# Patient Record
Sex: FEMALE | Race: WHITE | NOT HISPANIC OR LATINO
[De-identification: names, ages, dates, MRNs, and addresses within clinical notes are randomized per-mention and may not be internally consistent; named-entity substitution may affect disease eponyms.]

---

## 2019-04-22 ENCOUNTER — APPOINTMENT (OUTPATIENT)
Dept: ORTHOPEDIC SURGERY | Facility: CLINIC | Age: 79
End: 2019-04-22
Payer: MEDICARE

## 2019-04-22 VITALS — BODY MASS INDEX: 31.65 KG/M2 | HEIGHT: 65 IN | WEIGHT: 190 LBS | RESPIRATION RATE: 16 BRPM

## 2019-04-22 DIAGNOSIS — Z82.61 FAMILY HISTORY OF ARTHRITIS: ICD-10-CM

## 2019-04-22 DIAGNOSIS — M19.041 PRIMARY OSTEOARTHRITIS, RIGHT HAND: ICD-10-CM

## 2019-04-22 DIAGNOSIS — Z78.9 OTHER SPECIFIED HEALTH STATUS: ICD-10-CM

## 2019-04-22 DIAGNOSIS — M19.042 PRIMARY OSTEOARTHRITIS, RIGHT HAND: ICD-10-CM

## 2019-04-22 DIAGNOSIS — Z00.00 ENCOUNTER FOR GENERAL ADULT MEDICAL EXAMINATION W/OUT ABNORMAL FINDINGS: ICD-10-CM

## 2019-04-22 PROCEDURE — 76882 US LMTD JT/FCL EVL NVASC XTR: CPT | Mod: 59,LT

## 2019-04-22 PROCEDURE — 20600 DRAIN/INJ JOINT/BURSA W/O US: CPT | Mod: LT

## 2019-04-22 PROCEDURE — 73130 X-RAY EXAM OF HAND: CPT | Mod: 50

## 2019-04-22 PROCEDURE — 99203 OFFICE O/P NEW LOW 30 MIN: CPT | Mod: 25

## 2019-07-01 ENCOUNTER — APPOINTMENT (OUTPATIENT)
Dept: ORTHOPEDIC SURGERY | Facility: CLINIC | Age: 79
End: 2019-07-01
Payer: MEDICARE

## 2019-07-01 VITALS — BODY MASS INDEX: 31.65 KG/M2 | WEIGHT: 190 LBS | HEIGHT: 65 IN | RESPIRATION RATE: 16 BRPM

## 2019-07-01 DIAGNOSIS — R22.32 LOCALIZED SWELLING, MASS AND LUMP, LEFT UPPER LIMB: ICD-10-CM

## 2019-07-01 PROCEDURE — 99214 OFFICE O/P EST MOD 30 MIN: CPT | Mod: 25

## 2019-07-01 PROCEDURE — 20612 ASPIRATE/INJ GANGLION CYST: CPT | Mod: LT

## 2019-11-14 ENCOUNTER — RESULT REVIEW (OUTPATIENT)
Age: 79
End: 2019-11-14

## 2019-11-14 ENCOUNTER — INPATIENT (INPATIENT)
Facility: HOSPITAL | Age: 79
LOS: 3 days | Discharge: HOME | DRG: 331 | End: 2019-11-18
Attending: SURGERY | Admitting: SURGERY
Payer: MEDICARE

## 2019-11-14 VITALS
WEIGHT: 190.04 LBS | DIASTOLIC BLOOD PRESSURE: 78 MMHG | RESPIRATION RATE: 18 BRPM | HEART RATE: 60 BPM | TEMPERATURE: 98 F | SYSTOLIC BLOOD PRESSURE: 130 MMHG | HEIGHT: 65 IN

## 2019-11-14 DIAGNOSIS — Z90.710 ACQUIRED ABSENCE OF BOTH CERVIX AND UTERUS: Chronic | ICD-10-CM

## 2019-11-14 LAB
ALBUMIN SERPL ELPH-MCNC: 3.5 G/DL — SIGNIFICANT CHANGE UP (ref 3.4–5)
ALP SERPL-CCNC: 50 U/L — SIGNIFICANT CHANGE UP (ref 40–120)
ALT FLD-CCNC: 22 U/L — SIGNIFICANT CHANGE UP (ref 12–42)
ANION GAP SERPL CALC-SCNC: 7 MMOL/L — LOW (ref 9–16)
APPEARANCE UR: CLEAR — SIGNIFICANT CHANGE UP
APTT BLD: 27 SEC — LOW (ref 27.5–36.3)
AST SERPL-CCNC: 17 U/L — SIGNIFICANT CHANGE UP (ref 15–37)
BASOPHILS # BLD AUTO: 0.03 K/UL — SIGNIFICANT CHANGE UP (ref 0–0.2)
BASOPHILS NFR BLD AUTO: 0.3 % — SIGNIFICANT CHANGE UP (ref 0–2)
BILIRUB SERPL-MCNC: 0.8 MG/DL — SIGNIFICANT CHANGE UP (ref 0.2–1.2)
BILIRUB UR-MCNC: NEGATIVE — SIGNIFICANT CHANGE UP
BLD GP AB SCN SERPL QL: NEGATIVE — SIGNIFICANT CHANGE UP
BLD GP AB SCN SERPL QL: NEGATIVE — SIGNIFICANT CHANGE UP
BUN SERPL-MCNC: 14 MG/DL — SIGNIFICANT CHANGE UP (ref 7–23)
CALCIUM SERPL-MCNC: 9.5 MG/DL — SIGNIFICANT CHANGE UP (ref 8.5–10.5)
CHLORIDE SERPL-SCNC: 104 MMOL/L — SIGNIFICANT CHANGE UP (ref 96–108)
CO2 SERPL-SCNC: 29 MMOL/L — SIGNIFICANT CHANGE UP (ref 22–31)
COLOR SPEC: YELLOW — SIGNIFICANT CHANGE UP
CREAT SERPL-MCNC: 0.71 MG/DL — SIGNIFICANT CHANGE UP (ref 0.5–1.3)
DIFF PNL FLD: NEGATIVE — SIGNIFICANT CHANGE UP
EOSINOPHIL # BLD AUTO: 0.04 K/UL — SIGNIFICANT CHANGE UP (ref 0–0.5)
EOSINOPHIL NFR BLD AUTO: 0.3 % — SIGNIFICANT CHANGE UP (ref 0–6)
GLUCOSE SERPL-MCNC: 97 MG/DL — SIGNIFICANT CHANGE UP (ref 70–99)
GLUCOSE UR QL: NEGATIVE — SIGNIFICANT CHANGE UP
HCT VFR BLD CALC: 44.9 % — SIGNIFICANT CHANGE UP (ref 34.5–45)
HGB BLD-MCNC: 14.7 G/DL — SIGNIFICANT CHANGE UP (ref 11.5–15.5)
IMM GRANULOCYTES NFR BLD AUTO: 0.3 % — SIGNIFICANT CHANGE UP (ref 0–1.5)
INR BLD: 1.07 — SIGNIFICANT CHANGE UP (ref 0.88–1.16)
KETONES UR-MCNC: NEGATIVE — SIGNIFICANT CHANGE UP
LACTATE SERPL-SCNC: 2.2 MMOL/L — HIGH (ref 0.4–2)
LEUKOCYTE ESTERASE UR-ACNC: NEGATIVE — SIGNIFICANT CHANGE UP
LIDOCAIN IGE QN: 107 U/L — SIGNIFICANT CHANGE UP (ref 73–393)
LYMPHOCYTES # BLD AUTO: 2.36 K/UL — SIGNIFICANT CHANGE UP (ref 1–3.3)
LYMPHOCYTES # BLD AUTO: 20.6 % — SIGNIFICANT CHANGE UP (ref 13–44)
MAGNESIUM SERPL-MCNC: 2.3 MG/DL — SIGNIFICANT CHANGE UP (ref 1.6–2.6)
MCHC RBC-ENTMCNC: 29.2 PG — SIGNIFICANT CHANGE UP (ref 27–34)
MCHC RBC-ENTMCNC: 32.7 GM/DL — SIGNIFICANT CHANGE UP (ref 32–36)
MCV RBC AUTO: 89.1 FL — SIGNIFICANT CHANGE UP (ref 80–100)
MONOCYTES # BLD AUTO: 0.81 K/UL — SIGNIFICANT CHANGE UP (ref 0–0.9)
MONOCYTES NFR BLD AUTO: 7.1 % — SIGNIFICANT CHANGE UP (ref 2–14)
NEUTROPHILS # BLD AUTO: 8.19 K/UL — HIGH (ref 1.8–7.4)
NEUTROPHILS NFR BLD AUTO: 71.4 % — SIGNIFICANT CHANGE UP (ref 43–77)
NITRITE UR-MCNC: NEGATIVE — SIGNIFICANT CHANGE UP
NRBC # BLD: 0 /100 WBCS — SIGNIFICANT CHANGE UP (ref 0–0)
PH UR: 6 — SIGNIFICANT CHANGE UP (ref 5–8)
PLATELET # BLD AUTO: 188 K/UL — SIGNIFICANT CHANGE UP (ref 150–400)
POTASSIUM SERPL-MCNC: 4.3 MMOL/L — SIGNIFICANT CHANGE UP (ref 3.5–5.3)
POTASSIUM SERPL-SCNC: 4.3 MMOL/L — SIGNIFICANT CHANGE UP (ref 3.5–5.3)
PROT SERPL-MCNC: 7.5 G/DL — SIGNIFICANT CHANGE UP (ref 6.4–8.2)
PROT UR-MCNC: NEGATIVE MG/DL — SIGNIFICANT CHANGE UP
PROTHROM AB SERPL-ACNC: 12.1 SEC — SIGNIFICANT CHANGE UP (ref 10–12.9)
RBC # BLD: 5.04 M/UL — SIGNIFICANT CHANGE UP (ref 3.8–5.2)
RBC # FLD: 13.2 % — SIGNIFICANT CHANGE UP (ref 10.3–14.5)
RH IG SCN BLD-IMP: POSITIVE — SIGNIFICANT CHANGE UP
RH IG SCN BLD-IMP: POSITIVE — SIGNIFICANT CHANGE UP
SODIUM SERPL-SCNC: 140 MMOL/L — SIGNIFICANT CHANGE UP (ref 132–145)
SP GR SPEC: <=1.005 — SIGNIFICANT CHANGE UP (ref 1–1.03)
TROPONIN I SERPL-MCNC: <0.017 NG/ML — LOW (ref 0.02–0.06)
UROBILINOGEN FLD QL: 0.2 E.U./DL — SIGNIFICANT CHANGE UP
WBC # BLD: 11.46 K/UL — HIGH (ref 3.8–10.5)
WBC # FLD AUTO: 11.46 K/UL — HIGH (ref 3.8–10.5)

## 2019-11-14 PROCEDURE — 74177 CT ABD & PELVIS W/CONTRAST: CPT | Mod: 26

## 2019-11-14 PROCEDURE — 71045 X-RAY EXAM CHEST 1 VIEW: CPT | Mod: 26

## 2019-11-14 PROCEDURE — 99285 EMERGENCY DEPT VISIT HI MDM: CPT

## 2019-11-14 RX ORDER — HYDROMORPHONE HYDROCHLORIDE 2 MG/ML
1 INJECTION INTRAMUSCULAR; INTRAVENOUS; SUBCUTANEOUS EVERY 4 HOURS
Refills: 0 | Status: DISCONTINUED | OUTPATIENT
Start: 2019-11-14 | End: 2019-11-18

## 2019-11-14 RX ORDER — PIPERACILLIN AND TAZOBACTAM 4; .5 G/20ML; G/20ML
3.38 INJECTION, POWDER, LYOPHILIZED, FOR SOLUTION INTRAVENOUS EVERY 6 HOURS
Refills: 0 | Status: DISCONTINUED | OUTPATIENT
Start: 2019-11-14 | End: 2019-11-18

## 2019-11-14 RX ORDER — SODIUM CHLORIDE 9 MG/ML
1000 INJECTION, SOLUTION INTRAVENOUS
Refills: 0 | Status: DISCONTINUED | OUTPATIENT
Start: 2019-11-14 | End: 2019-11-18

## 2019-11-14 RX ORDER — METRONIDAZOLE 500 MG
500 TABLET ORAL ONCE
Refills: 0 | Status: COMPLETED | OUTPATIENT
Start: 2019-11-14 | End: 2019-11-14

## 2019-11-14 RX ORDER — CEFTRIAXONE 500 MG/1
1000 INJECTION, POWDER, FOR SOLUTION INTRAMUSCULAR; INTRAVENOUS ONCE
Refills: 0 | Status: COMPLETED | OUTPATIENT
Start: 2019-11-14 | End: 2019-11-14

## 2019-11-14 RX ORDER — PIPERACILLIN AND TAZOBACTAM 4; .5 G/20ML; G/20ML
3.38 INJECTION, POWDER, LYOPHILIZED, FOR SOLUTION INTRAVENOUS ONCE
Refills: 0 | Status: COMPLETED | OUTPATIENT
Start: 2019-11-14 | End: 2019-11-14

## 2019-11-14 RX ORDER — SODIUM CHLORIDE 9 MG/ML
1000 INJECTION, SOLUTION INTRAVENOUS
Refills: 0 | Status: DISCONTINUED | OUTPATIENT
Start: 2019-11-14 | End: 2019-11-14

## 2019-11-14 RX ORDER — ONDANSETRON 8 MG/1
4 TABLET, FILM COATED ORAL EVERY 6 HOURS
Refills: 0 | Status: DISCONTINUED | OUTPATIENT
Start: 2019-11-14 | End: 2019-11-14

## 2019-11-14 RX ORDER — HYDROMORPHONE HYDROCHLORIDE 2 MG/ML
0.5 INJECTION INTRAMUSCULAR; INTRAVENOUS; SUBCUTANEOUS EVERY 4 HOURS
Refills: 0 | Status: DISCONTINUED | OUTPATIENT
Start: 2019-11-14 | End: 2019-11-14

## 2019-11-14 RX ORDER — HEPARIN SODIUM 5000 [USP'U]/ML
5000 INJECTION INTRAVENOUS; SUBCUTANEOUS EVERY 8 HOURS
Refills: 0 | Status: DISCONTINUED | OUTPATIENT
Start: 2019-11-14 | End: 2019-11-14

## 2019-11-14 RX ORDER — HYDROMORPHONE HYDROCHLORIDE 2 MG/ML
0.5 INJECTION INTRAMUSCULAR; INTRAVENOUS; SUBCUTANEOUS EVERY 4 HOURS
Refills: 0 | Status: DISCONTINUED | OUTPATIENT
Start: 2019-11-14 | End: 2019-11-18

## 2019-11-14 RX ORDER — HYDROMORPHONE HYDROCHLORIDE 2 MG/ML
1 INJECTION INTRAMUSCULAR; INTRAVENOUS; SUBCUTANEOUS EVERY 4 HOURS
Refills: 0 | Status: DISCONTINUED | OUTPATIENT
Start: 2019-11-14 | End: 2019-11-14

## 2019-11-14 RX ORDER — IOHEXOL 300 MG/ML
30 INJECTION, SOLUTION INTRAVENOUS ONCE
Refills: 0 | Status: COMPLETED | OUTPATIENT
Start: 2019-11-14 | End: 2019-11-14

## 2019-11-14 RX ADMIN — Medication 100 MILLIGRAM(S): at 13:36

## 2019-11-14 RX ADMIN — IOHEXOL 30 MILLILITER(S): 300 INJECTION, SOLUTION INTRAVENOUS at 12:35

## 2019-11-14 RX ADMIN — PIPERACILLIN AND TAZOBACTAM 200 GRAM(S): 4; .5 INJECTION, POWDER, LYOPHILIZED, FOR SOLUTION INTRAVENOUS at 22:42

## 2019-11-14 RX ADMIN — CEFTRIAXONE 100 MILLIGRAM(S): 500 INJECTION, POWDER, FOR SOLUTION INTRAMUSCULAR; INTRAVENOUS at 13:01

## 2019-11-14 NOTE — ED ADULT TRIAGE NOTE - CHIEF COMPLAINT QUOTE
pt c/o rlq pain since last night. denies n/v/d. PMD stated to go to ED to r/o appy and diverticulitis. hx hysterectomy.

## 2019-11-14 NOTE — ED ADULT NURSE NOTE - OBJECTIVE STATEMENT
A&Ox3, c/o abdominal pain to RLQ that started yesterday evening - denies N/V/D. In no acute distress - family at the bedside.

## 2019-11-14 NOTE — ED PROVIDER NOTE - CLINICAL SUMMARY MEDICAL DECISION MAKING FREE TEXT BOX
Patient presents with 1 day hx of RLQ pain concerning for appendicitis/diverticulitis. Will check labs, CT abd/pelvis, and give empiric Ceftriaxone and Flagyl. Patient declined pain meds at this time.

## 2019-11-14 NOTE — ED ADULT NURSE NOTE - ED STAT RN HAND OFF
CHOLECYSTECTOMY  2012    [place]    COLONOSCOPY  1-    Dr LUJAN    COLONOSCOPY  05/06/2014    Dr. Mari Hdz  2012    Dr Ricky Kat    partial    KNEE SURGERY  2011    left knee surgery    PTCA      stent    TUBAL LIGATION      UPPER GASTROINTESTINAL ENDOSCOPY  2009?  UPPER GASTROINTESTINAL ENDOSCOPY  1-    Dr LUJAN    UPPER GASTROINTESTINAL ENDOSCOPY  05/06/2014    Dr. Andrade Shadow   01- [facility]    Left Leg Ablation    VARICOSE VEIN SURGERY  3-  [facility]    right leg ablation       Current Outpatient Prescriptions   Medication Sig Dispense Refill    Mirabegron ER 50 MG TB24 Take 50 mg by mouth daily 30 tablet 0    metoprolol (TOPROL-XL) 200 MG XL tablet Take 200 mg by mouth daily      ranitidine (ZANTAC 150 MAXIMUM STRENGTH) 150 MG tablet Take 150 mg by mouth nightly      BENICAR HCT 40-25 MG per tablet Take 1 tablet by mouth daily       calcium carbonate 600 MG TABS tablet Take 1 tablet by mouth 2 times daily as needed       Vitamin D (CHOLECALCIFEROL) 1000 UNITS CAPS capsule Take 3,000 Units by mouth daily       clopidogrel (PLAVIX) 75 MG tablet Take 75 mg by mouth daily.  allopurinol (ZYLOPRIM) 300 MG tablet Take 300 mg by mouth daily.  aspirin 81 MG EC tablet Take 81 mg by mouth daily.  loratadine (CLARITIN) 10 MG tablet Take 10 mg by mouth daily.  Multiple Vitamin (MULTIVITAMIN PO) Take 1 tablet by mouth daily        No current facility-administered medications for this visit. Allergies   Allergen Reactions    Reclast [Zoledronic Acid] Other (See Comments)     Patient has a intolerance to this medication .  It makes her feel \"out of it\"     Celexa [Citalopram Hydrobromide] Nausea Only     Nausea and headache    Demerol Nausea Only    Dye [Barium-Containing Compounds] Nausea Only    Hydrocodone-Acetaminophen Other (See Comments)     Shaking, attacks nervous system    Lasix [Furosemide] Nausea Only    Neurontin [Gabapentin] Nausea Only     Dizziness      Nitroglycerin Other (See Comments)     Passed out    Plaquenil [Hydroxychloroquine Sulfate] Other (See Comments)     nervousness    Cozaar [Losartan Potassium] Palpitations    Esomeprazole Magnesium Nausea And Vomiting    Lisinopril Other (See Comments)     Muscle cramps in legs    Prilosec [Omeprazole] Nausea And Vomiting       Social History     Social History    Marital status:      Spouse name: N/A    Number of children: N/A    Years of education: N/A     Social History Main Topics    Smoking status: Never Smoker    Smokeless tobacco: Never Used    Alcohol use No    Drug use: No    Sexual activity: Not Asked     Other Topics Concern    None     Social History Narrative    None       Family History   Problem Relation Age of Onset    Diabetes Mother     Heart Disease Mother     Heart Disease Father     Diabetes Father     High Blood Pressure Father     Esophageal Cancer Father     Colon Polyps Father     Crohn's Disease Brother     Colon Cancer Neg Hx     Liver Cancer Neg Hx     Liver Disease Neg Hx     Rectal Cancer Neg Hx     Stomach Cancer Neg Hx        REVIEW OF SYSTEMS:  Review of Systems   Constitutional: Negative for chills and fever. HENT: Negative for hearing loss. Eyes: Negative for discharge and redness. Respiratory: Negative for shortness of breath and wheezing. Cardiovascular: Negative for leg swelling and PND. Gastrointestinal: Negative for heartburn and nausea. Genitourinary: Positive for frequency and urgency. Negative for dysuria, flank pain and hematuria. Musculoskeletal: Negative for myalgias and neck pain. Skin: Negative for itching and rash. Neurological: Negative for seizures, loss of consciousness and headaches.    Endo/Heme/Allergies: Negative for environmental Handoff

## 2019-11-14 NOTE — PATIENT PROFILE ADULT - NSPROMEDSPUMP_GEN_A_NUR
Patient arrives [facility] for abdominal pain shortness of breath nausea vomiting diarrhea. Patient to have follow up appointment with GI on Monday. Denies fevers at home. Patient states upper abdominal pain epigastric pain states worse last night.  Recent high blood wbc count and on antibx but finished recently
none

## 2019-11-14 NOTE — H&P ADULT - NSHPLABSRESULTS_GEN_ALL_CORE
LABS:                        14.7   11.46 )-----------( 188      ( 14 Nov 2019 12:48 )             44.9     11-14    140  |  104  |  14  ----------------------------<  97  4.3   |  29  |  0.71    Ca    9.5      14 Nov 2019 12:48  Mg     2.3     11-14    TPro  7.5  /  Alb  3.5  /  TBili  0.8  /  DBili  x   /  AST  17  /  ALT  22  /  AlkPhos  50  11-14      Urinalysis Basic - ( 14 Nov 2019 14:10 )    Color: Yellow / Appearance: Clear / SG: <=1.005 / pH: x  Gluc: x / Ketone: NEGATIVE  / Bili: NEGATIVE / Urobili: 0.2 E.U./dL   Blood: x / Protein: NEGATIVE mg/dL / Nitrite: NEGATIVE   Leuk Esterase: NEGATIVE / RBC: x / WBC x   Sq Epi: x / Non Sq Epi: x / Bacteria: x        RADIOLOGY & ADDITIONAL STUDIES:  CT Abdomen and Pelvis w/ Oral Cont and w/ IV Cont:   EXAM:  CT ABDOMEN AND PELVIS OC IC                           PROCEDURE DATE:  11/14/2019          INTERPRETATION:  CT of the ABDOMEN and PELVIS with intravenous contrast   dated 11/14/2019 2:35 PM    INDICATION: RLQ pain     TECHNIQUE: CT of the abdomen and pelvis was performed. Axial and coronal    and sagittal images were produced and reviewed.    CONTRAST USAGE:  IV contrast: Omnipaque 350: 100 ml administered; ml discarded.    Oral contrast: administered.    PRIOR STUDIES: None.    FINDINGS: Images of the lower chest demonstrate no abnormality.    The liver is normal in size. Three cysts versus biliary hamartomas in the   liver including two in the caudate lobe/segment one and another at   junction of segments five and six. Status post cholecystectomy..  The   pancreas is normal in appearance.  No splenic abnormalities are seen.    The adrenal glands are unremarkable. The kidneys are normal in   appearance.        No abdominal aortic aneurysm is seen. No lymphadenopathy is seen.     Evaluation of the bowel demonstrates that the ingested oral contrast   material has reached the distal small bowel. No bowel obstruction. There   is evidence of abnormal retrocecal appendix with a diameter of 1.7 cm.   Appendiceal wall thickening. Periappendiceal fat infiltration. No piyush   appendiceal abscess. No macro perforation.. No ascites is seen. Small   midline fat-containing inferior periumbilical hernia.  2 infraumbilical midline fat-containing hernias. Fat-containing left   indirect inguinal hernia. The colon is loaded with fecal material. No   cecal mass identified.    Images of the pelvis demonstrate that the patient is status post   hysterectomy. Vaginal cuff intact. The ovaries are not identified in the   site. Likely status post bilateral oophorectomy. Unremarkable urinary   bladder.     Evaluation of the osseous structures demonstrates no significant finding.      IMPRESSION: Acute appendicitis.    Findings called in to ER at 2:50 PM 11/14/2019        AMMON ESCOTO M.D., ATTENDING RADIOLOGIST  This document has been electronically signed.  Nov 14 2019  2:54PM              (11-14-19 @ 14:35)

## 2019-11-14 NOTE — PROGRESS NOTE ADULT - SUBJECTIVE AND OBJECTIVE BOX
POST-OPERATIVE NOTE    Procedure: Lap appy    Diagnosis/Indication: appendicitis    Surgeon: Guerrero    S: C/o of expected post-operative pain, fairly well controlled w/ medication. Denies CP, SOB, GUTIERREZ, calf tenderness. Denies nausea, vomiting.    O:  T(C): 36.8 (11-14-19 @ 21:45), Max: 36.8 (11-14-19 @ 20:45)  T(F): 98.3 (11-14-19 @ 21:45), Max: 98.3 (11-14-19 @ 21:45)  HR: 64 (11-14-19 @ 23:00) (54 - 64)  BP: 125/64 (11-14-19 @ 23:00) (110/58 - 132/68)  RR: 16 (11-14-19 @ 23:00) (14 - 18)  SpO2: 95% (11-14-19 @ 23:00) (94% - 95%)  Wt(kg): --                        14.7   11.46 )-----------( 188      ( 14 Nov 2019 12:48 )             44.9     11-14    140  |  104  |  14  ----------------------------<  97  4.3   |  29  |  0.71    Ca    9.5      14 Nov 2019 12:48  Mg     2.3     11-14    TPro  7.5  /  Alb  3.5  /  TBili  0.8  /  DBili  x   /  AST  17  /  ALT  22  /  AlkPhos  50  11-14      Gen: NAD, resting comfortably in bed  C/V: NSR  Pulm: Nonlabored breathing, no respiratory distress  Abd: soft, appropriately tender, non distended, incisions clean, dry and intact.   Extrem: WWP, no calf edema or tenderness, SCDs in place

## 2019-11-14 NOTE — ED PROVIDER NOTE - ENMT, MLM
Airway patent. Moist mucous membrane. Throat has no vesicles, no oropharyngeal exudates and uvula is midline.

## 2019-11-14 NOTE — PROGRESS NOTE ADULT - ASSESSMENT
A/P: 78y Female s/p above procedure  NPO/IVF  Pain/nausea control  SQH/SCDs/OOBA/IS  Dispo pending pain control, PO tolerance, clinical improvement

## 2019-11-14 NOTE — ED PROVIDER NOTE - OBJECTIVE STATEMENT
57 y/o female with PMHx of hysterectomy for fibroids 25 years ago presents to ED c/o abd pain. Patient reports she ate a pie at approximately 11:30AM yesterday, and endorses slightly worsening RLQ abd pain since then. States she developed shaking chills at around 4PM yesterday, and notes some decreased appetite (skipped dinner and had a light breakfast in the morning). Patient states symptoms slightly improved throughout the night and reports that pain is now mostly with palpation. Denies any fever, nausea, vomiting, diarrhea, constipation, dysuria, or hematuria. States she called her PCP today, who advised her to come to the ED for evaluation to r/o appendicitis and diverticulitis. 59 y/o female with PMHx of hysterectomy for fibroids 25 years ago presents to ED c/o abd pain. Patient reports she ate a pie at approximately 11:30AM yesterday, and endorses slightly worsening RLQ abd pain since then. States she developed shaking chills at around 4PM yesterday, and notes some decreased appetite (skipped dinner and had a light breakfast in the morning). Patient states symptoms slightly improved throughout the night and reports that pain is now mostly with palpation. Denies any fever, nausea, vomiting, diarrhea, constipation, dysuria, or hematuria. States she called her PCP today, who advised her to come to the ED for evaluation to r/o appendicitis and diverticulitis.    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 Has a tour this Sat. She is accompanied by her wife of 25 years.

## 2019-11-14 NOTE — H&P ADULT - HISTORY OF PRESENT ILLNESS
78F w/ PMHx BHUPENDRA-BSO for uterine fibroids 26 years ago and no other PMHx presents w/ 1 day history of abd pain which started in RLQ and spread throughout lower abdomen with associated chills and anorexia. Patient states in the morning after eating pie she noted this abdominal pain which persisted until this morning at which time it was only painful when she pressed on it. She denies CP, SOB, N/V, constipation, diarrhea. Last BM was this morning and was normal. Last colonoscopy was within the last 3-5 years and was wnl.

## 2019-11-15 LAB
ANION GAP SERPL CALC-SCNC: 10 MMOL/L — SIGNIFICANT CHANGE UP (ref 5–17)
BUN SERPL-MCNC: 12 MG/DL — SIGNIFICANT CHANGE UP (ref 7–23)
CALCIUM SERPL-MCNC: 9.2 MG/DL — SIGNIFICANT CHANGE UP (ref 8.4–10.5)
CHLORIDE SERPL-SCNC: 106 MMOL/L — SIGNIFICANT CHANGE UP (ref 96–108)
CO2 SERPL-SCNC: 25 MMOL/L — SIGNIFICANT CHANGE UP (ref 22–31)
CREAT SERPL-MCNC: 0.65 MG/DL — SIGNIFICANT CHANGE UP (ref 0.5–1.3)
GLUCOSE SERPL-MCNC: 168 MG/DL — HIGH (ref 70–99)
GRAM STN FLD: SIGNIFICANT CHANGE UP
HCT VFR BLD CALC: 41.1 % — SIGNIFICANT CHANGE UP (ref 34.5–45)
HGB BLD-MCNC: 13.5 G/DL — SIGNIFICANT CHANGE UP (ref 11.5–15.5)
MAGNESIUM SERPL-MCNC: 2.2 MG/DL — SIGNIFICANT CHANGE UP (ref 1.6–2.6)
MCHC RBC-ENTMCNC: 29.3 PG — SIGNIFICANT CHANGE UP (ref 27–34)
MCHC RBC-ENTMCNC: 32.8 GM/DL — SIGNIFICANT CHANGE UP (ref 32–36)
MCV RBC AUTO: 89.3 FL — SIGNIFICANT CHANGE UP (ref 80–100)
NRBC # BLD: 0 /100 WBCS — SIGNIFICANT CHANGE UP (ref 0–0)
PHOSPHATE SERPL-MCNC: 3.4 MG/DL — SIGNIFICANT CHANGE UP (ref 2.5–4.5)
PLATELET # BLD AUTO: 170 K/UL — SIGNIFICANT CHANGE UP (ref 150–400)
POTASSIUM SERPL-MCNC: 4.3 MMOL/L — SIGNIFICANT CHANGE UP (ref 3.5–5.3)
POTASSIUM SERPL-SCNC: 4.3 MMOL/L — SIGNIFICANT CHANGE UP (ref 3.5–5.3)
RBC # BLD: 4.6 M/UL — SIGNIFICANT CHANGE UP (ref 3.8–5.2)
RBC # FLD: 12.9 % — SIGNIFICANT CHANGE UP (ref 10.3–14.5)
SODIUM SERPL-SCNC: 141 MMOL/L — SIGNIFICANT CHANGE UP (ref 135–145)
SPECIMEN SOURCE: SIGNIFICANT CHANGE UP
WBC # BLD: 11.37 K/UL — HIGH (ref 3.8–10.5)
WBC # FLD AUTO: 11.37 K/UL — HIGH (ref 3.8–10.5)

## 2019-11-15 RX ORDER — KETOROLAC TROMETHAMINE 30 MG/ML
15 SYRINGE (ML) INJECTION EVERY 6 HOURS
Refills: 0 | Status: DISCONTINUED | OUTPATIENT
Start: 2019-11-15 | End: 2019-11-18

## 2019-11-15 RX ORDER — ACETAMINOPHEN 500 MG
650 TABLET ORAL EVERY 6 HOURS
Refills: 0 | Status: DISCONTINUED | OUTPATIENT
Start: 2019-11-15 | End: 2019-11-18

## 2019-11-15 RX ORDER — ONDANSETRON 8 MG/1
4 TABLET, FILM COATED ORAL EVERY 6 HOURS
Refills: 0 | Status: DISCONTINUED | OUTPATIENT
Start: 2019-11-15 | End: 2019-11-18

## 2019-11-15 RX ORDER — BENZOCAINE 10 %
1 GEL (GRAM) MUCOUS MEMBRANE THREE TIMES A DAY
Refills: 0 | Status: DISCONTINUED | OUTPATIENT
Start: 2019-11-15 | End: 2019-11-15

## 2019-11-15 RX ORDER — LATANOPROST 0.05 MG/ML
1 SOLUTION/ DROPS OPHTHALMIC; TOPICAL AT BEDTIME
Refills: 0 | Status: DISCONTINUED | OUTPATIENT
Start: 2019-11-15 | End: 2019-11-18

## 2019-11-15 RX ORDER — TIMOLOL 0.5 %
1 DROPS OPHTHALMIC (EYE) DAILY
Refills: 0 | Status: DISCONTINUED | OUTPATIENT
Start: 2019-11-15 | End: 2019-11-18

## 2019-11-15 RX ORDER — TIMOLOL 0.5 %
1 DROPS OPHTHALMIC (EYE)
Qty: 0 | Refills: 0 | DISCHARGE

## 2019-11-15 RX ORDER — LATANOPROST 0.05 MG/ML
1 SOLUTION/ DROPS OPHTHALMIC; TOPICAL
Qty: 0 | Refills: 0 | DISCHARGE

## 2019-11-15 RX ORDER — ENOXAPARIN SODIUM 100 MG/ML
40 INJECTION SUBCUTANEOUS ONCE
Refills: 0 | Status: COMPLETED | OUTPATIENT
Start: 2019-11-15 | End: 2019-11-15

## 2019-11-15 RX ORDER — ENOXAPARIN SODIUM 100 MG/ML
40 INJECTION SUBCUTANEOUS EVERY 24 HOURS
Refills: 0 | Status: DISCONTINUED | OUTPATIENT
Start: 2019-11-16 | End: 2019-11-18

## 2019-11-15 RX ORDER — HEPARIN SODIUM 5000 [USP'U]/ML
5000 INJECTION INTRAVENOUS; SUBCUTANEOUS EVERY 8 HOURS
Refills: 0 | Status: DISCONTINUED | OUTPATIENT
Start: 2019-11-15 | End: 2019-11-15

## 2019-11-15 RX ADMIN — HYDROMORPHONE HYDROCHLORIDE 0.5 MILLIGRAM(S): 2 INJECTION INTRAMUSCULAR; INTRAVENOUS; SUBCUTANEOUS at 22:10

## 2019-11-15 RX ADMIN — PIPERACILLIN AND TAZOBACTAM 200 GRAM(S): 4; .5 INJECTION, POWDER, LYOPHILIZED, FOR SOLUTION INTRAVENOUS at 04:13

## 2019-11-15 RX ADMIN — Medication 650 MILLIGRAM(S): at 12:31

## 2019-11-15 RX ADMIN — PIPERACILLIN AND TAZOBACTAM 200 GRAM(S): 4; .5 INJECTION, POWDER, LYOPHILIZED, FOR SOLUTION INTRAVENOUS at 21:10

## 2019-11-15 RX ADMIN — HYDROMORPHONE HYDROCHLORIDE 0.5 MILLIGRAM(S): 2 INJECTION INTRAMUSCULAR; INTRAVENOUS; SUBCUTANEOUS at 04:46

## 2019-11-15 RX ADMIN — Medication 650 MILLIGRAM(S): at 13:25

## 2019-11-15 RX ADMIN — PIPERACILLIN AND TAZOBACTAM 200 GRAM(S): 4; .5 INJECTION, POWDER, LYOPHILIZED, FOR SOLUTION INTRAVENOUS at 16:46

## 2019-11-15 RX ADMIN — PIPERACILLIN AND TAZOBACTAM 200 GRAM(S): 4; .5 INJECTION, POWDER, LYOPHILIZED, FOR SOLUTION INTRAVENOUS at 10:30

## 2019-11-15 RX ADMIN — ENOXAPARIN SODIUM 40 MILLIGRAM(S): 100 INJECTION SUBCUTANEOUS at 10:30

## 2019-11-15 RX ADMIN — LATANOPROST 1 DROP(S): 0.05 SOLUTION/ DROPS OPHTHALMIC; TOPICAL at 21:09

## 2019-11-15 RX ADMIN — HYDROMORPHONE HYDROCHLORIDE 0.5 MILLIGRAM(S): 2 INJECTION INTRAMUSCULAR; INTRAVENOUS; SUBCUTANEOUS at 21:10

## 2019-11-15 RX ADMIN — HYDROMORPHONE HYDROCHLORIDE 0.5 MILLIGRAM(S): 2 INJECTION INTRAMUSCULAR; INTRAVENOUS; SUBCUTANEOUS at 04:31

## 2019-11-15 NOTE — PROGRESS NOTE ADULT - SUBJECTIVE AND OBJECTIVE BOX
SUBJECTIVE: Feeling well, pain controlled, no N/V. Patient seen and examined bedside by chief resident.    piperacillin/tazobactam IVPB.. 3.375 Gram(s) IV Intermittent every 6 hours    MEDICATIONS  (PRN):  HYDROmorphone  Injectable 0.5 milliGRAM(s) IV Push every 4 hours PRN Moderate Pain (4 - 6)  HYDROmorphone  Injectable 1 milliGRAM(s) IV Push every 4 hours PRN Severe Pain (7 - 10)  ondansetron Injectable 4 milliGRAM(s) IV Push every 6 hours PRN Nausea and/or Vomiting      I&O's Detail    14 Nov 2019 07:01  -  15 Nov 2019 07:00  --------------------------------------------------------  IN:    lactated ringers.: 1100 mL  Total IN: 1100 mL    OUT:    Bulb: 45 mL    Voided: 1000 mL  Total OUT: 1045 mL    Total NET: 55 mL          Vital Signs Last 24 Hrs  T(C): 36.9 (15 Nov 2019 06:03), Max: 36.9 (15 Nov 2019 06:03)  T(F): 98.5 (15 Nov 2019 06:03), Max: 98.5 (15 Nov 2019 06:03)  HR: 63 (15 Nov 2019 06:03) (54 - 68)  BP: 110/63 (15 Nov 2019 06:03) (110/58 - 152/79)  BP(mean): 94 (14 Nov 2019 23:00) (83 - 99)  RR: 17 (15 Nov 2019 06:03) (14 - 18)  SpO2: 97% (15 Nov 2019 06:03) (94% - 97%)    General: NAD, resting comfortably in bed  C/V: NSR  Pulm: Nonlabored breathing, no respiratory distress  Abd: softly distended, nontender, incisions CDI, CYDNEY SS  Extrem: SCDs in place    LABS:                        14.7   11.46 )-----------( 188      ( 14 Nov 2019 12:48 )             44.9     11-14    140  |  104  |  14  ----------------------------<  97  4.3   |  29  |  0.71    Ca    9.5      14 Nov 2019 12:48  Mg     2.3     11-14    TPro  7.5  /  Alb  3.5  /  TBili  0.8  /  DBili  x   /  AST  17  /  ALT  22  /  AlkPhos  50  11-14    PT/INR - ( 14 Nov 2019 17:37 )   PT: 12.1 sec;   INR: 1.07          PTT - ( 14 Nov 2019 17:37 )  PTT:27.0 sec  Urinalysis Basic - ( 14 Nov 2019 14:10 )    Color: Yellow / Appearance: Clear / SG: <=1.005 / pH: x  Gluc: x / Ketone: NEGATIVE  / Bili: NEGATIVE / Urobili: 0.2 E.U./dL   Blood: x / Protein: NEGATIVE mg/dL / Nitrite: NEGATIVE   Leuk Esterase: NEGATIVE / RBC: x / WBC x   Sq Epi: x / Non Sq Epi: x / Bacteria: x        RADIOLOGY & ADDITIONAL STUDIES:  CT Abdomen and Pelvis w/ Oral Cont and w/ IV Cont:   EXAM:  CT ABDOMEN AND PELVIS OC IC                           PROCEDURE DATE:  11/14/2019          INTERPRETATION:  CT of the ABDOMEN and PELVIS with intravenous contrast   dated 11/14/2019 2:35 PM    INDICATION: RLQ pain     TECHNIQUE: CT of the abdomen and pelvis was performed. Axial and coronal    and sagittal images were produced and reviewed.    CONTRAST USAGE:  IV contrast: Omnipaque 350: 100 ml administered; ml discarded.    Oral contrast: administered.    PRIOR STUDIES: None.    FINDINGS: Images of the lower chest demonstrate no abnormality.    The liver is normal in size. Three cysts versus biliary hamartomas in the   liver including two in the caudate lobe/segment one and another at   junction of segments five and six. Status post cholecystectomy..  The   pancreas is normal in appearance.  No splenic abnormalities are seen.    The adrenal glands are unremarkable. The kidneys are normal in   appearance.        No abdominal aortic aneurysm is seen. No lymphadenopathy is seen.     Evaluation of the bowel demonstrates that the ingested oral contrast   material has reached the distal small bowel. No bowel obstruction. There   is evidence of abnormal retrocecal appendix with a diameter of 1.7 cm.   Appendiceal wall thickening. Periappendiceal fat infiltration. No piyush   appendiceal abscess. No macro perforation.. No ascites is seen. Small   midline fat-containing inferior periumbilical hernia.  2 infraumbilical midline fat-containing hernias. Fat-containing left   indirect inguinal hernia. The colon is loaded with fecal material. No   cecal mass identified.    Images of the pelvis demonstrate that the patient is status post   hysterectomy. Vaginal cuff intact. The ovaries are not identified in the   site. Likely status post bilateral oophorectomy. Unremarkable urinary   bladder.     Evaluation of the osseous structures demonstrates no significant finding.      IMPRESSION: Acute appendicitis.    Findings called in to ER at 2:50 PM 11/14/2019                  Thank you for the opportunity to participate in the care of this patient.        AMMON ESCOTO M.D., ATTENDING RADIOLOGIST  This document has been electronically signed.  Nov 14 2019  2:54PM               (11-14-19 @ 14:35)

## 2019-11-15 NOTE — PROGRESS NOTE ADULT - ASSESSMENT
78F w/ PMHx BHUPENDRA-BSO p/w abd pain x 1 day found to have acute appendicitis on CT scan.    POD 1 s/p lap appy, perforated and gangrenous    Pain/nausea control  NPO/IVF  CYDNEY Drain  Zosyn   OOBA/IS/SCD  Restart SQH pending  AM labs  Home eye drops

## 2019-11-16 LAB
ANION GAP SERPL CALC-SCNC: 10 MMOL/L — SIGNIFICANT CHANGE UP (ref 5–17)
BUN SERPL-MCNC: 15 MG/DL — SIGNIFICANT CHANGE UP (ref 7–23)
CALCIUM SERPL-MCNC: 8.8 MG/DL — SIGNIFICANT CHANGE UP (ref 8.4–10.5)
CHLORIDE SERPL-SCNC: 107 MMOL/L — SIGNIFICANT CHANGE UP (ref 96–108)
CO2 SERPL-SCNC: 26 MMOL/L — SIGNIFICANT CHANGE UP (ref 22–31)
CREAT SERPL-MCNC: 0.74 MG/DL — SIGNIFICANT CHANGE UP (ref 0.5–1.3)
GLUCOSE SERPL-MCNC: 123 MG/DL — HIGH (ref 70–99)
HCT VFR BLD CALC: 38.7 % — SIGNIFICANT CHANGE UP (ref 34.5–45)
HGB BLD-MCNC: 12.6 G/DL — SIGNIFICANT CHANGE UP (ref 11.5–15.5)
MAGNESIUM SERPL-MCNC: 2.2 MG/DL — SIGNIFICANT CHANGE UP (ref 1.6–2.6)
MCHC RBC-ENTMCNC: 29.6 PG — SIGNIFICANT CHANGE UP (ref 27–34)
MCHC RBC-ENTMCNC: 32.6 GM/DL — SIGNIFICANT CHANGE UP (ref 32–36)
MCV RBC AUTO: 90.8 FL — SIGNIFICANT CHANGE UP (ref 80–100)
NRBC # BLD: 0 /100 WBCS — SIGNIFICANT CHANGE UP (ref 0–0)
PHOSPHATE SERPL-MCNC: 2.5 MG/DL — SIGNIFICANT CHANGE UP (ref 2.5–4.5)
PLATELET # BLD AUTO: 158 K/UL — SIGNIFICANT CHANGE UP (ref 150–400)
POTASSIUM SERPL-MCNC: 3.9 MMOL/L — SIGNIFICANT CHANGE UP (ref 3.5–5.3)
POTASSIUM SERPL-SCNC: 3.9 MMOL/L — SIGNIFICANT CHANGE UP (ref 3.5–5.3)
RBC # BLD: 4.26 M/UL — SIGNIFICANT CHANGE UP (ref 3.8–5.2)
RBC # FLD: 13.2 % — SIGNIFICANT CHANGE UP (ref 10.3–14.5)
SODIUM SERPL-SCNC: 143 MMOL/L — SIGNIFICANT CHANGE UP (ref 135–145)
WBC # BLD: 9.12 K/UL — SIGNIFICANT CHANGE UP (ref 3.8–10.5)
WBC # FLD AUTO: 9.12 K/UL — SIGNIFICANT CHANGE UP (ref 3.8–10.5)

## 2019-11-16 RX ORDER — METOCLOPRAMIDE HCL 10 MG
5 TABLET ORAL EVERY 6 HOURS
Refills: 0 | Status: DISCONTINUED | OUTPATIENT
Start: 2019-11-16 | End: 2019-11-16

## 2019-11-16 RX ADMIN — Medication 650 MILLIGRAM(S): at 04:43

## 2019-11-16 RX ADMIN — PIPERACILLIN AND TAZOBACTAM 200 GRAM(S): 4; .5 INJECTION, POWDER, LYOPHILIZED, FOR SOLUTION INTRAVENOUS at 16:35

## 2019-11-16 RX ADMIN — PIPERACILLIN AND TAZOBACTAM 200 GRAM(S): 4; .5 INJECTION, POWDER, LYOPHILIZED, FOR SOLUTION INTRAVENOUS at 04:43

## 2019-11-16 RX ADMIN — PIPERACILLIN AND TAZOBACTAM 200 GRAM(S): 4; .5 INJECTION, POWDER, LYOPHILIZED, FOR SOLUTION INTRAVENOUS at 10:28

## 2019-11-16 RX ADMIN — LATANOPROST 1 DROP(S): 0.05 SOLUTION/ DROPS OPHTHALMIC; TOPICAL at 21:51

## 2019-11-16 RX ADMIN — ENOXAPARIN SODIUM 40 MILLIGRAM(S): 100 INJECTION SUBCUTANEOUS at 12:17

## 2019-11-16 RX ADMIN — PIPERACILLIN AND TAZOBACTAM 200 GRAM(S): 4; .5 INJECTION, POWDER, LYOPHILIZED, FOR SOLUTION INTRAVENOUS at 21:51

## 2019-11-16 RX ADMIN — Medication 650 MILLIGRAM(S): at 05:40

## 2019-11-16 NOTE — PROGRESS NOTE ADULT - SUBJECTIVE AND OBJECTIVE BOX
INTERVAL HPI/OVERNIGHT EVENTS: No acute events overnight     STATUS POST:  Laparoscopic appendectomy    POST OPERATIVE DAY #: 2    MEDICATIONS  (STANDING):  enoxaparin Injectable 40 milliGRAM(s) SubCutaneous every 24 hours  lactated ringers. 1000 milliLiter(s) (100 mL/Hr) IV Continuous <Continuous>  latanoprost 0.005% Ophthalmic Solution 1 Drop(s) Both EYES at bedtime  piperacillin/tazobactam IVPB.. 3.375 Gram(s) IV Intermittent every 6 hours  timolol 0.5% Solution 1 Drop(s) Both EYES daily    MEDICATIONS  (PRN):  acetaminophen   Tablet .. 650 milliGRAM(s) Oral every 6 hours PRN Mild Pain (1 - 3)  HYDROmorphone  Injectable 0.5 milliGRAM(s) IV Push every 4 hours PRN Moderate Pain (4 - 6)  HYDROmorphone  Injectable 1 milliGRAM(s) IV Push every 4 hours PRN Severe Pain (7 - 10)  ketorolac   Injectable 15 milliGRAM(s) IV Push every 6 hours PRN Mild Pain (1 - 3)  ondansetron Injectable 4 milliGRAM(s) IV Push every 6 hours PRN Nausea and/or Vomiting      Vital Signs Last 24 Hrs  T(C): 36.9 (16 Nov 2019 05:08), Max: 37 (15 Nov 2019 17:14)  T(F): 98.4 (16 Nov 2019 05:08), Max: 98.6 (15 Nov 2019 17:14)  HR: 60 (16 Nov 2019 05:08) (51 - 63)  BP: 145/71 (16 Nov 2019 05:08) (116/68 - 145/71)  BP(mean): --  RR: 16 (16 Nov 2019 05:08) (16 - 19)  SpO2: 94% (16 Nov 2019 05:08) (94% - 94%)    PHYSICAL EXAM:      Constitutional: A&Ox3    Respiratory: non labored breathing, no respiratory distress    Cardiovascular: NSR, RRR    Gastrointestinal:                  Incision:    Extremities: (-) edema                  I&O's Detail    14 Nov 2019 07:01  -  15 Nov 2019 07:00  --------------------------------------------------------  IN:    lactated ringers.: 1200 mL  Total IN: 1200 mL    OUT:    Bulb: 45 mL    Voided: 1000 mL  Total OUT: 1045 mL    Total NET: 155 mL      15 Nov 2019 07:01  -  16 Nov 2019 06:24  --------------------------------------------------------  IN:    lactated ringers.: 2300 mL  Total IN: 2300 mL    OUT:    Bulb: 23 mL    Voided: 1150 mL  Total OUT: 1173 mL    Total NET: 1127 mL          LABS:                        12.6   9.12  )-----------( 158      ( 16 Nov 2019 05:52 )             38.7     11-15    141  |  106  |  12  ----------------------------<  168<H>  4.3   |  25  |  0.65    Ca    9.2      15 Nov 2019 07:24  Phos  3.4     11-15  Mg     2.2     11-15    TPro  7.5  /  Alb  3.5  /  TBili  0.8  /  DBili  x   /  AST  17  /  ALT  22  /  AlkPhos  50  11-14    PT/INR - ( 14 Nov 2019 17:37 )   PT: 12.1 sec;   INR: 1.07          PTT - ( 14 Nov 2019 17:37 )  PTT:27.0 sec  Urinalysis Basic - ( 14 Nov 2019 14:10 )    Color: Yellow / Appearance: Clear / SG: <=1.005 / pH: x  Gluc: x / Ketone: NEGATIVE  / Bili: NEGATIVE / Urobili: 0.2 E.U./dL   Blood: x / Protein: NEGATIVE mg/dL / Nitrite: NEGATIVE   Leuk Esterase: NEGATIVE / RBC: x / WBC x   Sq Epi: x / Non Sq Epi: x / Bacteria: x        RADIOLOGY & ADDITIONAL STUDIES: INTERVAL HPI/OVERNIGHT EVENTS: No acute events overnight. This AM pt is feeling well, denies any n/v/cp/sob. She is passing gas, voiding and ambulating.     STATUS POST:  Laparoscopic appendectomy    POST OPERATIVE DAY #: 2    MEDICATIONS  (STANDING):  enoxaparin Injectable 40 milliGRAM(s) SubCutaneous every 24 hours  lactated ringers. 1000 milliLiter(s) (100 mL/Hr) IV Continuous <Continuous>  latanoprost 0.005% Ophthalmic Solution 1 Drop(s) Both EYES at bedtime  piperacillin/tazobactam IVPB.. 3.375 Gram(s) IV Intermittent every 6 hours  timolol 0.5% Solution 1 Drop(s) Both EYES daily    MEDICATIONS  (PRN):  acetaminophen   Tablet .. 650 milliGRAM(s) Oral every 6 hours PRN Mild Pain (1 - 3)  HYDROmorphone  Injectable 0.5 milliGRAM(s) IV Push every 4 hours PRN Moderate Pain (4 - 6)  HYDROmorphone  Injectable 1 milliGRAM(s) IV Push every 4 hours PRN Severe Pain (7 - 10)  ketorolac   Injectable 15 milliGRAM(s) IV Push every 6 hours PRN Mild Pain (1 - 3)  ondansetron Injectable 4 milliGRAM(s) IV Push every 6 hours PRN Nausea and/or Vomiting      Vital Signs Last 24 Hrs  T(C): 36.9 (16 Nov 2019 05:08), Max: 37 (15 Nov 2019 17:14)  T(F): 98.4 (16 Nov 2019 05:08), Max: 98.6 (15 Nov 2019 17:14)  HR: 60 (16 Nov 2019 05:08) (51 - 63)  BP: 145/71 (16 Nov 2019 05:08) (116/68 - 145/71)  BP(mean): --  RR: 16 (16 Nov 2019 05:08) (16 - 19)  SpO2: 94% (16 Nov 2019 05:08) (94% - 94%)    PHYSICAL EXAM:      Constitutional: A&Ox3    Respiratory: non labored breathing, no respiratory distress    Cardiovascular: NSR, RRR    Gastrointestinal: abdomen is soft, tender to palpation near incision sites, non distended                 Incision: c/d/i. CYDNEY ss    Extremities: (-) edema                  I&O's Detail    14 Nov 2019 07:01  -  15 Nov 2019 07:00  --------------------------------------------------------  IN:    lactated ringers.: 1200 mL  Total IN: 1200 mL    OUT:    Bulb: 45 mL    Voided: 1000 mL  Total OUT: 1045 mL    Total NET: 155 mL      15 Nov 2019 07:01  -  16 Nov 2019 06:24  --------------------------------------------------------  IN:    lactated ringers.: 2300 mL  Total IN: 2300 mL    OUT:    Bulb: 23 mL    Voided: 1150 mL  Total OUT: 1173 mL    Total NET: 1127 mL          LABS:                        12.6   9.12  )-----------( 158      ( 16 Nov 2019 05:52 )             38.7     11-15    141  |  106  |  12  ----------------------------<  168<H>  4.3   |  25  |  0.65    Ca    9.2      15 Nov 2019 07:24  Phos  3.4     11-15  Mg     2.2     11-15    TPro  7.5  /  Alb  3.5  /  TBili  0.8  /  DBili  x   /  AST  17  /  ALT  22  /  AlkPhos  50  11-14    PT/INR - ( 14 Nov 2019 17:37 )   PT: 12.1 sec;   INR: 1.07          PTT - ( 14 Nov 2019 17:37 )  PTT:27.0 sec  Urinalysis Basic - ( 14 Nov 2019 14:10 )    Color: Yellow / Appearance: Clear / SG: <=1.005 / pH: x  Gluc: x / Ketone: NEGATIVE  / Bili: NEGATIVE / Urobili: 0.2 E.U./dL   Blood: x / Protein: NEGATIVE mg/dL / Nitrite: NEGATIVE   Leuk Esterase: NEGATIVE / RBC: x / WBC x   Sq Epi: x / Non Sq Epi: x / Bacteria: x        RADIOLOGY & ADDITIONAL STUDIES:

## 2019-11-16 NOTE — PROGRESS NOTE ADULT - ASSESSMENT
78F w/ PMHx BHUPENDRA-BSO p/w abd pain x 1 day found to have acute appendicitis on CT scan. now s/p lap appendectomy perforated and gangrenous    POD 2 s/p lap appy perf and gangrenous     Pain/nausea control  NPO/IVF  CYDNEY Drain  Zosyn   OOBA/IS/SCD/SQH  Home eye drops  AM labs  Awaiting bowel function

## 2019-11-17 ENCOUNTER — TRANSCRIPTION ENCOUNTER (OUTPATIENT)
Age: 79
End: 2019-11-17

## 2019-11-17 LAB
ANION GAP SERPL CALC-SCNC: 9 MMOL/L — SIGNIFICANT CHANGE UP (ref 5–17)
BUN SERPL-MCNC: 9 MG/DL — SIGNIFICANT CHANGE UP (ref 7–23)
CALCIUM SERPL-MCNC: 8.8 MG/DL — SIGNIFICANT CHANGE UP (ref 8.4–10.5)
CHLORIDE SERPL-SCNC: 106 MMOL/L — SIGNIFICANT CHANGE UP (ref 96–108)
CO2 SERPL-SCNC: 28 MMOL/L — SIGNIFICANT CHANGE UP (ref 22–31)
CREAT SERPL-MCNC: 0.66 MG/DL — SIGNIFICANT CHANGE UP (ref 0.5–1.3)
GLUCOSE SERPL-MCNC: 116 MG/DL — HIGH (ref 70–99)
HCT VFR BLD CALC: 38.3 % — SIGNIFICANT CHANGE UP (ref 34.5–45)
HGB BLD-MCNC: 12 G/DL — SIGNIFICANT CHANGE UP (ref 11.5–15.5)
MAGNESIUM SERPL-MCNC: 2 MG/DL — SIGNIFICANT CHANGE UP (ref 1.6–2.6)
MCHC RBC-ENTMCNC: 28.7 PG — SIGNIFICANT CHANGE UP (ref 27–34)
MCHC RBC-ENTMCNC: 31.3 GM/DL — LOW (ref 32–36)
MCV RBC AUTO: 91.6 FL — SIGNIFICANT CHANGE UP (ref 80–100)
NRBC # BLD: 0 /100 WBCS — SIGNIFICANT CHANGE UP (ref 0–0)
PHOSPHATE SERPL-MCNC: 2.7 MG/DL — SIGNIFICANT CHANGE UP (ref 2.5–4.5)
PLATELET # BLD AUTO: 158 K/UL — SIGNIFICANT CHANGE UP (ref 150–400)
POTASSIUM SERPL-MCNC: 3.8 MMOL/L — SIGNIFICANT CHANGE UP (ref 3.5–5.3)
POTASSIUM SERPL-SCNC: 3.8 MMOL/L — SIGNIFICANT CHANGE UP (ref 3.5–5.3)
RBC # BLD: 4.18 M/UL — SIGNIFICANT CHANGE UP (ref 3.8–5.2)
RBC # FLD: 13.1 % — SIGNIFICANT CHANGE UP (ref 10.3–14.5)
SODIUM SERPL-SCNC: 143 MMOL/L — SIGNIFICANT CHANGE UP (ref 135–145)
WBC # BLD: 6.98 K/UL — SIGNIFICANT CHANGE UP (ref 3.8–10.5)
WBC # FLD AUTO: 6.98 K/UL — SIGNIFICANT CHANGE UP (ref 3.8–10.5)

## 2019-11-17 RX ORDER — POTASSIUM CHLORIDE 20 MEQ
10 PACKET (EA) ORAL ONCE
Refills: 0 | Status: COMPLETED | OUTPATIENT
Start: 2019-11-17 | End: 2019-11-17

## 2019-11-17 RX ADMIN — PIPERACILLIN AND TAZOBACTAM 200 GRAM(S): 4; .5 INJECTION, POWDER, LYOPHILIZED, FOR SOLUTION INTRAVENOUS at 22:10

## 2019-11-17 RX ADMIN — PIPERACILLIN AND TAZOBACTAM 200 GRAM(S): 4; .5 INJECTION, POWDER, LYOPHILIZED, FOR SOLUTION INTRAVENOUS at 16:47

## 2019-11-17 RX ADMIN — Medication 650 MILLIGRAM(S): at 14:50

## 2019-11-17 RX ADMIN — PIPERACILLIN AND TAZOBACTAM 200 GRAM(S): 4; .5 INJECTION, POWDER, LYOPHILIZED, FOR SOLUTION INTRAVENOUS at 10:01

## 2019-11-17 RX ADMIN — PIPERACILLIN AND TAZOBACTAM 200 GRAM(S): 4; .5 INJECTION, POWDER, LYOPHILIZED, FOR SOLUTION INTRAVENOUS at 04:43

## 2019-11-17 RX ADMIN — LATANOPROST 1 DROP(S): 0.05 SOLUTION/ DROPS OPHTHALMIC; TOPICAL at 22:10

## 2019-11-17 RX ADMIN — Medication 650 MILLIGRAM(S): at 15:40

## 2019-11-17 RX ADMIN — ENOXAPARIN SODIUM 40 MILLIGRAM(S): 100 INJECTION SUBCUTANEOUS at 11:32

## 2019-11-17 RX ADMIN — Medication 100 MILLIEQUIVALENT(S): at 11:33

## 2019-11-17 NOTE — PROGRESS NOTE ADULT - SUBJECTIVE AND OBJECTIVE BOX
INTERVAL HPI/OVERNIGHT EVENTS:  Seen and examined bedside. NAEO.    MEDICATIONS  (STANDING):  enoxaparin Injectable 40 milliGRAM(s) SubCutaneous every 24 hours  lactated ringers. 1000 milliLiter(s) (100 mL/Hr) IV Continuous <Continuous>  latanoprost 0.005% Ophthalmic Solution 1 Drop(s) Both EYES at bedtime  piperacillin/tazobactam IVPB.. 3.375 Gram(s) IV Intermittent every 6 hours  timolol 0.5% Solution 1 Drop(s) Both EYES daily    MEDICATIONS  (PRN):  acetaminophen   Tablet .. 650 milliGRAM(s) Oral every 6 hours PRN Mild Pain (1 - 3)  HYDROmorphone  Injectable 0.5 milliGRAM(s) IV Push every 4 hours PRN Moderate Pain (4 - 6)  HYDROmorphone  Injectable 1 milliGRAM(s) IV Push every 4 hours PRN Severe Pain (7 - 10)  ketorolac   Injectable 15 milliGRAM(s) IV Push every 6 hours PRN Mild Pain (1 - 3)  ondansetron Injectable 4 milliGRAM(s) IV Push every 6 hours PRN Nausea and/or Vomiting      Vital Signs Last 24 Hrs  T(C): 36.9 (17 Nov 2019 04:52), Max: 37 (16 Nov 2019 09:19)  T(F): 98.4 (17 Nov 2019 04:52), Max: 98.6 (16 Nov 2019 09:19)  HR: 61 (17 Nov 2019 04:52) (54 - 61)  BP: 160/70 (17 Nov 2019 04:52) (129/73 - 160/70)  BP(mean): --  RR: 16 (17 Nov 2019 04:52) (16 - 18)  SpO2: 94% (17 Nov 2019 04:52) (90% - 95%)    PHYSICAL EXAM:    Constitutional: A&Ox3    Respiratory: non labored breathing, no respiratory distress    Cardiovascular: NSR, RRR    Gastrointestinal: abdomen is soft, tender to palpation near incision sites, non distended                 Incision: c/d/i. CYDNEY ss    Extremities: (-) edema      I&O's Detail    15 Nov 2019 07:01  -  16 Nov 2019 07:00  --------------------------------------------------------  IN:    lactated ringers.: 2300 mL  Total IN: 2300 mL    OUT:    Bulb: 23 mL    Voided: 1150 mL  Total OUT: 1173 mL    Total NET: 1127 mL      16 Nov 2019 07:01  -  17 Nov 2019 06:35  --------------------------------------------------------  IN:    lactated ringers.: 2200 mL  Total IN: 2200 mL    OUT:    Bulb: 17.5 mL    Voided: 800 mL  Total OUT: 817.5 mL    Total NET: 1382.5 mL          LABS:                        12.0   6.98  )-----------( 158      ( 17 Nov 2019 05:29 )             38.3     11-17    143  |  106  |  9   ----------------------------<  116<H>  3.8   |  28  |  0.66    Ca    8.8      17 Nov 2019 05:29  Phos  2.7     11-17  Mg     2.0     11-17            RADIOLOGY & ADDITIONAL STUDIES:

## 2019-11-17 NOTE — DISCHARGE NOTE PROVIDER - CARE PROVIDER_API CALL
Jose Masters)  Surgery  [address], [address]  [place], [place] [address]  Phone: [phone]  Fax: [phone]  Follow Up Time: 1 week

## 2019-11-17 NOTE — DISCHARGE NOTE PROVIDER - NSDCMRMEDTOKEN_GEN_ALL_CORE_FT
latanoprost 0.005% ophthalmic solution: 1 drop(s) to each affected eye once a day (in the evening)  timolol maleate 0.5% ophthalmic gel forming solution: 1 drop(s) to each affected eye once a day amoxicillin-clavulanate 875 mg-125 mg oral tablet: 1 tab(s) orally 2 times a day   latanoprost 0.005% ophthalmic solution: 1 drop(s) to each affected eye once a day (in the evening)  Percocet 5/325 oral tablet: 1 tab(s) orally every 6 hours, As Needed -for severe pain MDD:4 tabs  timolol maleate 0.5% ophthalmic gel forming solution: 1 drop(s) to each affected eye once a day

## 2019-11-17 NOTE — DISCHARGE NOTE PROVIDER - NSDCCPCAREPLAN_GEN_ALL_CORE_FT
PRINCIPAL DISCHARGE DIAGNOSIS  Diagnosis: Acute appendicitis, unspecified acute appendicitis type  Assessment and Plan of Treatment: General Discharge Instructions:  Please resume all regular home medications unless specifically advised not to take a particular medication. Also, please take any new medications as prescribed.  Please get plenty of rest, continue to ambulate several times per day, and drink adequate amounts of fluids. Avoid lifting weights greater than 5-10 lbs until you follow-up with your surgeon, who will instruct you further regarding activity restrictions.  Avoid driving or operating heavy machinery while taking pain medications.  Please follow-up with your surgeon and Primary Care Provider (PCP) as advised.  Incision Care:  *Please call your doctor or nurse practitioner if you have increased pain, swelling, redness, or drainage from the incision site.  *Avoid swimming and baths until your follow-up appointment.  *You may shower, and wash surgical incisions with a mild soap and warm water. Gently pat the area dry.  *If you have staples, they will be removed at your follow-up appointment.  *If you have steri-strips, they will fall off on their own. Please remove any remaining strips 7-10 days after surgery.  You were prescribed 5 days of the antibiotic augmentin which you will take twice per day upon discharge.  You were prescribed percocet for pain, which you may take every six hours as needed for severe pain, not to exceed 4 tabs per day. PRINCIPAL DISCHARGE DIAGNOSIS  Diagnosis: Acute appendicitis, unspecified acute appendicitis type  Assessment and Plan of Treatment: General Discharge Instructions:  Please resume all regular home medications unless specifically advised not to take a particular medication. Also, please take any new medications as prescribed.  Please get plenty of rest, continue to ambulate several times per day, and drink adequate amounts of fluids. Avoid lifting weights greater than 5-10 lbs until you follow-up with your surgeon, who will instruct you further regarding activity restrictions.  Avoid driving or operating heavy machinery while taking pain medications.  Please follow-up with your surgeon and Primary Care Provider (PCP) as advised.  Incision Care:  *Please call your doctor or nurse practitioner if you have increased pain, swelling, redness, or drainage from the incision site.  *Avoid swimming and baths until your follow-up appointment.  *You may shower, and wash surgical incisions with a mild soap and warm water. Gently pat the area dry.  *If you have staples, they will be removed at your follow-up appointment.  *If you have steri-strips, they will fall off on their own. Please remove any remaining strips 7-10 days after surgery.  *Your drain will stay in until follow-up with Dr. Masters. Please keep the area dry until follow-up.   You were prescribed 5 days of the antibiotic augmentin which you will take twice per day upon discharge.  You were prescribed percocet for pain, which you may take every six hours as needed for severe pain, not to exceed 4 tabs per day. PRINCIPAL DISCHARGE DIAGNOSIS  Diagnosis: Acute appendicitis, unspecified acute appendicitis type  Assessment and Plan of Treatment: General Discharge Instructions:  Please resume all regular home medications unless specifically advised not to take a particular medication.   Please take antibiotics Augmentin as prescribed for 5 more days.   Please take pain medication as prescribed for severe pain.  Please get plenty of rest, continue to ambulate several times per day, and drink adequate amounts of fluids.   Avoid lifting weights greater than 5-10 lbs until you follow-up with your surgeon, who will instruct you further regarding activity restrictions.  Avoid driving or operating heavy machinery while taking pain medications.  Please follow-up with your surgeon Dr. Masters in his office at Wednesday. Please call his office at [phone] to schedule an appointment.  Incision Care:  *Please call your doctor or nurse practitioner if you have increased pain, swelling, redness, or drainage from the incision site.  *Avoid swimming and baths until your follow-up appointment.  *You may shower, and wash surgical incisions with a mild soap and warm water. Gently pat the area dry.  *Your drain will stay in until follow-up with Dr. Masters. Please keep the area dry until follow-up.   Please look at the site every day for signs of infection (increased redness or pain, swelling, odor, yellow or bloody discharge, warm to touch, fever). Maintain suction of the bulb. Note color, consistency, and amount of fluid in the drain. Call the doctor, nurse practitioner, or VNS nurse if the amount increases significantly or changes in character. Be sure to empty the drain frequently. Record the output, if instructed to do so. You may shower; wash the area gently with warm, soapy water. Keep the insertion site clean and dry otherwise. Avoid swimming, baths, hot tubs; do not submerge yourself in water. Make sure to keep the drain attached securely to your body to prevent pulling or dislocation.

## 2019-11-17 NOTE — PROGRESS NOTE ADULT - ASSESSMENT
78F w/ PMHx BHUPENDRA-BSO p/w abd pain x 1 day found to have acute appendicitis on CT scan. now s/p lap appendectomy perforated and gangrenous    POD 2 s/p lap appy perf and gangrenous     Pain/nausea control  Soft/IVF  CYDNEY Drain  Zosyn   OOBA/IS/SCD/SQH  Home eye drops  AM labs

## 2019-11-17 NOTE — DISCHARGE NOTE PROVIDER - HOSPITAL COURSE
78F w/ PMHx BHUPENDRA-BSO for uterine fibroids 26 years ago and no other PMHx presents w/ 1 day history of abd pain which started in RLQ and spread throughout lower abdomen with associated chills and anorexia. Patient states in the morning after eating pie she noted this abdominal pain which persisted until morning at which time it was only painful when she pressed on it. She denied CP, SOB, N/V, constipation, diarrhea. She was found to have acute appendicitis w/ perforation and subsequently underwent a laparoscopic appendectomy. A CYDNEY drain was placed. Her diet was advanced appropriately which she tolerated well. Endorsed a bowel movement and flatus prior to discharge.  She was discharged on 5 days of Augmentin.

## 2019-11-18 ENCOUNTER — TRANSCRIPTION ENCOUNTER (OUTPATIENT)
Age: 79
End: 2019-11-18

## 2019-11-18 VITALS
TEMPERATURE: 98 F | RESPIRATION RATE: 16 BRPM | SYSTOLIC BLOOD PRESSURE: 165 MMHG | OXYGEN SATURATION: 95 % | HEART RATE: 63 BPM | DIASTOLIC BLOOD PRESSURE: 85 MMHG

## 2019-11-18 LAB
-  AMPICILLIN/SULBACTAM: SIGNIFICANT CHANGE UP
-  AMPICILLIN: SIGNIFICANT CHANGE UP
-  AMPICILLIN: SIGNIFICANT CHANGE UP
-  CEFAZOLIN: SIGNIFICANT CHANGE UP
-  CEFTRIAXONE: SIGNIFICANT CHANGE UP
-  GENTAMICIN: SIGNIFICANT CHANGE UP
-  PIPERACILLIN/TAZOBACTAM: SIGNIFICANT CHANGE UP
-  TOBRAMYCIN: SIGNIFICANT CHANGE UP
-  TRIMETHOPRIM/SULFAMETHOXAZOLE: SIGNIFICANT CHANGE UP
-  VANCOMYCIN: SIGNIFICANT CHANGE UP
ANION GAP SERPL CALC-SCNC: 10 MMOL/L — SIGNIFICANT CHANGE UP (ref 5–17)
BUN SERPL-MCNC: 7 MG/DL — SIGNIFICANT CHANGE UP (ref 7–23)
CALCIUM SERPL-MCNC: 9 MG/DL — SIGNIFICANT CHANGE UP (ref 8.4–10.5)
CHLORIDE SERPL-SCNC: 106 MMOL/L — SIGNIFICANT CHANGE UP (ref 96–108)
CO2 SERPL-SCNC: 24 MMOL/L — SIGNIFICANT CHANGE UP (ref 22–31)
CREAT SERPL-MCNC: 0.58 MG/DL — SIGNIFICANT CHANGE UP (ref 0.5–1.3)
CULTURE RESULTS: SIGNIFICANT CHANGE UP
GLUCOSE SERPL-MCNC: 109 MG/DL — HIGH (ref 70–99)
HCT VFR BLD CALC: 38.2 % — SIGNIFICANT CHANGE UP (ref 34.5–45)
HGB BLD-MCNC: 12.4 G/DL — SIGNIFICANT CHANGE UP (ref 11.5–15.5)
MAGNESIUM SERPL-MCNC: 1.8 MG/DL — SIGNIFICANT CHANGE UP (ref 1.6–2.6)
MCHC RBC-ENTMCNC: 29.2 PG — SIGNIFICANT CHANGE UP (ref 27–34)
MCHC RBC-ENTMCNC: 32.5 GM/DL — SIGNIFICANT CHANGE UP (ref 32–36)
MCV RBC AUTO: 89.9 FL — SIGNIFICANT CHANGE UP (ref 80–100)
METHOD TYPE: SIGNIFICANT CHANGE UP
NRBC # BLD: 0 /100 WBCS — SIGNIFICANT CHANGE UP (ref 0–0)
ORGANISM # SPEC MICROSCOPIC CNT: SIGNIFICANT CHANGE UP
PHOSPHATE SERPL-MCNC: 2.7 MG/DL — SIGNIFICANT CHANGE UP (ref 2.5–4.5)
PLATELET # BLD AUTO: 171 K/UL — SIGNIFICANT CHANGE UP (ref 150–400)
POTASSIUM SERPL-MCNC: 3.7 MMOL/L — SIGNIFICANT CHANGE UP (ref 3.5–5.3)
POTASSIUM SERPL-SCNC: 3.7 MMOL/L — SIGNIFICANT CHANGE UP (ref 3.5–5.3)
RBC # BLD: 4.25 M/UL — SIGNIFICANT CHANGE UP (ref 3.8–5.2)
RBC # FLD: 12.9 % — SIGNIFICANT CHANGE UP (ref 10.3–14.5)
SODIUM SERPL-SCNC: 140 MMOL/L — SIGNIFICANT CHANGE UP (ref 135–145)
SPECIMEN SOURCE: SIGNIFICANT CHANGE UP
WBC # BLD: 6.82 K/UL — SIGNIFICANT CHANGE UP (ref 3.8–10.5)
WBC # FLD AUTO: 6.82 K/UL — SIGNIFICANT CHANGE UP (ref 3.8–10.5)

## 2019-11-18 RX ORDER — POTASSIUM CHLORIDE 20 MEQ
40 PACKET (EA) ORAL ONCE
Refills: 0 | Status: COMPLETED | OUTPATIENT
Start: 2019-11-18 | End: 2019-11-18

## 2019-11-18 RX ADMIN — Medication 40 MILLIEQUIVALENT(S): at 07:32

## 2019-11-18 RX ADMIN — PIPERACILLIN AND TAZOBACTAM 200 GRAM(S): 4; .5 INJECTION, POWDER, LYOPHILIZED, FOR SOLUTION INTRAVENOUS at 10:11

## 2019-11-18 RX ADMIN — PIPERACILLIN AND TAZOBACTAM 200 GRAM(S): 4; .5 INJECTION, POWDER, LYOPHILIZED, FOR SOLUTION INTRAVENOUS at 04:06

## 2019-11-18 NOTE — PROGRESS NOTE ADULT - ASSESSMENT
78F w/ PMHx BHUPENDRA-BSO p/w abd pain x 1 day found to have acute appendicitis on CT scan. now s/p lap appendectomy perforated and gangrenous    POD 4  s/p lap appy perf and gangrenous     Pain/nausea control  Regular Diet/IVF  CYDNEY Drain  Zosyn   OOBA/IS/SCD/SQH  Home eye drops  AM labs  Likely dc today

## 2019-11-18 NOTE — PROGRESS NOTE ADULT - ATTENDING COMMENTS
C/O DIARRHEA.   Afebrile.  Tolerating PO.  Ambulating.  Diarrhea due to liquid diet and PO contrast vs C. dii infection.  Will observe through the day. If continues with diarrhea, will send stool cultures.  Plan to D/C home tomorrow.
Doing well.  Afebrile.  Tolerating PO.  Pain is controlled.  Diarrhea is improving.  D/C home with Po Abx.  F/U in 48 hours for drain removal.
No acute issues.  Pain is controlled with Dilaudid.  Wants to try Tylenol.  afebrile.  Abdomen is soft, appropriately tender, some distended.  no flatus.  CYDNEY - serosanguinous output.  Increase ambulation.  Continue Abx IV.  NPO, IVF.  Await bowel function.
Doing well.  Afebrile  Ambulates in the hallway.  Tolerates clears.  CYDNEY output serous.  Advance to soft diet.  Plan to D/C tomorrow with PO Abx.

## 2019-11-18 NOTE — PROGRESS NOTE ADULT - SUBJECTIVE AND OBJECTIVE BOX
INTERVAL HPI/OVERNIGHT EVENTS: No acute events overnight. Pt denies any further episodes of diarrhea. States she is feeling well. Denies n/v/cp/sob    STATUS POST:  laparoscopic appendectomy    POST OPERATIVE DAY #: 4    MEDICATIONS  (STANDING):  enoxaparin Injectable 40 milliGRAM(s) SubCutaneous every 24 hours  lactated ringers. 1000 milliLiter(s) (100 mL/Hr) IV Continuous <Continuous>  latanoprost 0.005% Ophthalmic Solution 1 Drop(s) Both EYES at bedtime  piperacillin/tazobactam IVPB.. 3.375 Gram(s) IV Intermittent every 6 hours  timolol 0.5% Solution 1 Drop(s) Both EYES daily    MEDICATIONS  (PRN):  acetaminophen   Tablet .. 650 milliGRAM(s) Oral every 6 hours PRN Mild Pain (1 - 3)  HYDROmorphone  Injectable 0.5 milliGRAM(s) IV Push every 4 hours PRN Moderate Pain (4 - 6)  HYDROmorphone  Injectable 1 milliGRAM(s) IV Push every 4 hours PRN Severe Pain (7 - 10)  ketorolac   Injectable 15 milliGRAM(s) IV Push every 6 hours PRN Mild Pain (1 - 3)  ondansetron Injectable 4 milliGRAM(s) IV Push every 6 hours PRN Nausea and/or Vomiting      Vital Signs Last 24 Hrs  T(C): 36.8 (18 Nov 2019 05:03), Max: 37.3 (17 Nov 2019 13:21)  T(F): 98.2 (18 Nov 2019 05:03), Max: 99.1 (17 Nov 2019 13:21)  HR: 63 (18 Nov 2019 05:03) (57 - 63)  BP: 165/85 (18 Nov 2019 05:03) (144/67 - 165/85)  BP(mean): --  RR: 16 (18 Nov 2019 05:03) (16 - 18)  SpO2: 95% (18 Nov 2019 05:03) (95% - 95%)    PHYSICAL EXAM:      Constitutional: A&Ox3    Respiratory: non labored breathing, no respiratory distress    Cardiovascular: NSR, RRR    Gastrointestinal: abdomen is soft, non-tender, non-distended                 Incision: c/d/i, jose with ss output    Extremities: (-) edema                  I&O's Detail    17 Nov 2019 07:01  -  18 Nov 2019 07:00  --------------------------------------------------------  IN:    lactated ringers.: 2300 mL  Total IN: 2300 mL    OUT:    Bulb: 32.5 mL    Voided: 1850 mL  Total OUT: 1882.5 mL    Total NET: 417.5 mL          LABS:                        12.4   6.82  )-----------( 171      ( 18 Nov 2019 05:56 )             38.2     11-18    140  |  106  |  7   ----------------------------<  109<H>  3.7   |  24  |  0.58    Ca    9.0      18 Nov 2019 05:56  Phos  2.7     11-18  Mg     1.8     11-18            RADIOLOGY & ADDITIONAL STUDIES:

## 2019-11-18 NOTE — DISCHARGE NOTE NURSING/CASE MANAGEMENT/SOCIAL WORK - PATIENT PORTAL LINK FT
You can access the FollowMyHealth Patient Portal offered by [facility] by registering at the following website: [email or web address]. By joining [employer]’s FollowMyHealth portal, you will also be able to view your health information using other applications (apps) compatible with our system.

## 2019-11-21 DIAGNOSIS — K35.32 ACUTE APPENDICITIS WITH PERFORATION, LOCALIZED PERITONITIS, AND GANGRENE, WITHOUT ABSCESS: ICD-10-CM

## 2019-11-21 DIAGNOSIS — Z90.710 ACQUIRED ABSENCE OF BOTH CERVIX AND UTERUS: ICD-10-CM

## 2019-11-21 DIAGNOSIS — R19.7 DIARRHEA, UNSPECIFIED: ICD-10-CM

## 2019-11-21 DIAGNOSIS — K66.0 PERITONEAL ADHESIONS (POSTPROCEDURAL) (POSTINFECTION): ICD-10-CM

## 2019-11-21 DIAGNOSIS — G47.33 OBSTRUCTIVE SLEEP APNEA (ADULT) (PEDIATRIC): ICD-10-CM

## 2019-11-21 DIAGNOSIS — E66.9 OBESITY, UNSPECIFIED: ICD-10-CM

## 2019-11-22 ENCOUNTER — EMERGENCY (EMERGENCY)
Facility: HOSPITAL | Age: 79
LOS: 1 days | Discharge: HOME | End: 2019-11-22
Admitting: EMERGENCY MEDICINE
Payer: MEDICARE

## 2019-11-22 VITALS
DIASTOLIC BLOOD PRESSURE: 83 MMHG | SYSTOLIC BLOOD PRESSURE: 130 MMHG | HEIGHT: 65 IN | HEART RATE: 71 BPM | RESPIRATION RATE: 16 BRPM | OXYGEN SATURATION: 95 % | TEMPERATURE: 98 F | WEIGHT: 190.04 LBS

## 2019-11-22 DIAGNOSIS — Z90.49 ACQUIRED ABSENCE OF OTHER SPECIFIED PARTS OF DIGESTIVE TRACT: Chronic | ICD-10-CM

## 2019-11-22 DIAGNOSIS — Z90.710 ACQUIRED ABSENCE OF BOTH CERVIX AND UTERUS: Chronic | ICD-10-CM

## 2019-11-22 PROBLEM — Z78.9 OTHER SPECIFIED HEALTH STATUS: Chronic | Status: ACTIVE | Noted: 2019-11-14

## 2019-11-22 LAB
ALBUMIN SERPL ELPH-MCNC: 3 G/DL — LOW (ref 3.4–5)
ALP SERPL-CCNC: 50 U/L — SIGNIFICANT CHANGE UP (ref 40–120)
ALT FLD-CCNC: 33 U/L — SIGNIFICANT CHANGE UP (ref 12–42)
ANION GAP SERPL CALC-SCNC: 6 MMOL/L — LOW (ref 9–16)
APPEARANCE UR: CLEAR — SIGNIFICANT CHANGE UP
AST SERPL-CCNC: 22 U/L — SIGNIFICANT CHANGE UP (ref 15–37)
BASOPHILS # BLD AUTO: 0.02 K/UL — SIGNIFICANT CHANGE UP (ref 0–0.2)
BASOPHILS NFR BLD AUTO: 0.2 % — SIGNIFICANT CHANGE UP (ref 0–2)
BILIRUB SERPL-MCNC: 0.3 MG/DL — SIGNIFICANT CHANGE UP (ref 0.2–1.2)
BILIRUB UR-MCNC: NEGATIVE — SIGNIFICANT CHANGE UP
BUN SERPL-MCNC: 13 MG/DL — SIGNIFICANT CHANGE UP (ref 7–23)
CALCIUM SERPL-MCNC: 9.2 MG/DL — SIGNIFICANT CHANGE UP (ref 8.5–10.5)
CHLORIDE SERPL-SCNC: 104 MMOL/L — SIGNIFICANT CHANGE UP (ref 96–108)
CO2 SERPL-SCNC: 31 MMOL/L — SIGNIFICANT CHANGE UP (ref 22–31)
COLOR SPEC: YELLOW — SIGNIFICANT CHANGE UP
CREAT SERPL-MCNC: 0.7 MG/DL — SIGNIFICANT CHANGE UP (ref 0.5–1.3)
DIFF PNL FLD: NEGATIVE — SIGNIFICANT CHANGE UP
EOSINOPHIL # BLD AUTO: 0.16 K/UL — SIGNIFICANT CHANGE UP (ref 0–0.5)
EOSINOPHIL NFR BLD AUTO: 2 % — SIGNIFICANT CHANGE UP (ref 0–6)
GLUCOSE SERPL-MCNC: 104 MG/DL — HIGH (ref 70–99)
GLUCOSE UR QL: NEGATIVE — SIGNIFICANT CHANGE UP
HCT VFR BLD CALC: 41.9 % — SIGNIFICANT CHANGE UP (ref 34.5–45)
HGB BLD-MCNC: 13.8 G/DL — SIGNIFICANT CHANGE UP (ref 11.5–15.5)
IMM GRANULOCYTES NFR BLD AUTO: 0.4 % — SIGNIFICANT CHANGE UP (ref 0–1.5)
KETONES UR-MCNC: NEGATIVE — SIGNIFICANT CHANGE UP
LEUKOCYTE ESTERASE UR-ACNC: NEGATIVE — SIGNIFICANT CHANGE UP
LIDOCAIN IGE QN: 181 U/L — SIGNIFICANT CHANGE UP (ref 73–393)
LYMPHOCYTES # BLD AUTO: 2.18 K/UL — SIGNIFICANT CHANGE UP (ref 1–3.3)
LYMPHOCYTES # BLD AUTO: 26.7 % — SIGNIFICANT CHANGE UP (ref 13–44)
MCHC RBC-ENTMCNC: 29 PG — SIGNIFICANT CHANGE UP (ref 27–34)
MCHC RBC-ENTMCNC: 32.9 GM/DL — SIGNIFICANT CHANGE UP (ref 32–36)
MCV RBC AUTO: 88 FL — SIGNIFICANT CHANGE UP (ref 80–100)
MONOCYTES # BLD AUTO: 0.71 K/UL — SIGNIFICANT CHANGE UP (ref 0–0.9)
MONOCYTES NFR BLD AUTO: 8.7 % — SIGNIFICANT CHANGE UP (ref 2–14)
NEUTROPHILS # BLD AUTO: 5.06 K/UL — SIGNIFICANT CHANGE UP (ref 1.8–7.4)
NEUTROPHILS NFR BLD AUTO: 62 % — SIGNIFICANT CHANGE UP (ref 43–77)
NITRITE UR-MCNC: NEGATIVE — SIGNIFICANT CHANGE UP
NRBC # BLD: 0 /100 WBCS — SIGNIFICANT CHANGE UP (ref 0–0)
PH UR: 7 — SIGNIFICANT CHANGE UP (ref 5–8)
PLATELET # BLD AUTO: 248 K/UL — SIGNIFICANT CHANGE UP (ref 150–400)
POTASSIUM SERPL-MCNC: 4.4 MMOL/L — SIGNIFICANT CHANGE UP (ref 3.5–5.3)
POTASSIUM SERPL-SCNC: 4.4 MMOL/L — SIGNIFICANT CHANGE UP (ref 3.5–5.3)
PROT SERPL-MCNC: 7.3 G/DL — SIGNIFICANT CHANGE UP (ref 6.4–8.2)
PROT UR-MCNC: NEGATIVE MG/DL — SIGNIFICANT CHANGE UP
RBC # BLD: 4.76 M/UL — SIGNIFICANT CHANGE UP (ref 3.8–5.2)
RBC # FLD: 13 % — SIGNIFICANT CHANGE UP (ref 10.3–14.5)
SODIUM SERPL-SCNC: 141 MMOL/L — SIGNIFICANT CHANGE UP (ref 132–145)
SP GR SPEC: 1.02 — SIGNIFICANT CHANGE UP (ref 1–1.03)
UROBILINOGEN FLD QL: 0.2 E.U./DL — SIGNIFICANT CHANGE UP
WBC # BLD: 8.16 K/UL — SIGNIFICANT CHANGE UP (ref 3.8–10.5)
WBC # FLD AUTO: 8.16 K/UL — SIGNIFICANT CHANGE UP (ref 3.8–10.5)

## 2019-11-22 PROCEDURE — 99284 EMERGENCY DEPT VISIT MOD MDM: CPT

## 2019-11-22 RX ORDER — SODIUM CHLORIDE 9 MG/ML
1000 INJECTION INTRAMUSCULAR; INTRAVENOUS; SUBCUTANEOUS ONCE
Refills: 0 | Status: COMPLETED | OUTPATIENT
Start: 2019-11-22 | End: 2019-11-22

## 2019-11-22 RX ADMIN — SODIUM CHLORIDE 1000 MILLILITER(S): 9 INJECTION INTRAMUSCULAR; INTRAVENOUS; SUBCUTANEOUS at 13:27

## 2019-11-22 NOTE — ED ADULT NURSE NOTE - NSIMPLEMENTINTERV_GEN_ALL_ED
Implemented All Universal Safety Interventions:  [place] to call system. Call bell, personal items and telephone within reach. Instruct patient to call for assistance. Room bathroom lighting operational. Non-slip footwear when patient is off stretcher. Physically safe environment: no spills, clutter or unnecessary equipment. Stretcher in lowest position, wheels locked, appropriate side rails in place.

## 2019-11-22 NOTE — ED ADULT NURSE NOTE - CHPI ED NUR SYMPTOMS NEG
no nausea/no abdominal distension/no blood in stool/no burning urination/no vomiting/no dysuria/no chills/no hematuria/no fever

## 2019-11-22 NOTE — ED PROVIDER NOTE - PATIENT PORTAL LINK FT
You can access the FollowMyHealth Patient Portal offered by [facility] by registering at the following website: [email or web address]. By joining [employer]’s FollowMyHealth portal, you will also be able to view your health information using other applications (apps) compatible with our system.

## 2019-11-22 NOTE — ED PROVIDER NOTE - CARE PROVIDER_API CALL
Edison Patel; MBBS)  Internal Medicine  [address] [place] [address]  Phone: [phone]  Fax: [phone]  Follow Up Time: 1-3 Days

## 2019-11-22 NOTE — ED PROVIDER NOTE - NSFOLLOWUPINSTRUCTIONS_ED_ALL_ED_FT
PLEASE RETURN TO THE ER TODAY WITH YOUR STOOL SPECIMEN AS DISCUSSED.    RETURN TO THE ER IMMEDIATELY IF YOU DEVELOP A FEVER, SHAKING CHILLS, ABDOMINAL PAIN, VOMITING, BLOODY STOOL, OR ANY OTHER CONCERNING SIGNS OR SYMPTOMS.

## 2019-11-22 NOTE — ED PROVIDER NOTE - OBJECTIVE STATEMENT
79 y/o F s/p appendectomy 8 days ago by Dr. Masters presents with 1 week of watery diarrhea. She states she has not have a solid BM sine her surgery, and the diarrhea appears to be worsening each day. She saw her surgeon Dr. Masters 2 days ago to have her surgical drain removed and states Dr. Masters told her that if her watery diarrhea persisted for 2 more days she would need to be evaluated for "bacterial infection". The diarrhea has persisted and she is now having episodes approximately every 45 minutes so she called Dr. Masters this morning and was advised to come here for stool studies for likely "bacterial infection". She denies having any other associated sx's and states she has been tolerating PO well.    Denies fever, chills, dizziness, syncope, CP, SOB, palpitations, abdo pain, N/V, hematemesis, hematochezia, melena, back or flank pain, dysuria, hematuria, abnormal vaginal bleeding or discharge

## 2019-11-22 NOTE — ED PROVIDER NOTE - CLINICAL SUMMARY MEDICAL DECISION MAKING FREE TEXT BOX
Labs and urine reviewed with no acute, concerning findings. She is afebrile nontoxic and sitting comfortably. Pt has been observed in the ER for 3.5 hours without any diarrhea and states she is unable to give a stool sample. Pt declining to stay any longer, states she now feels fine but is willing to bring stool specimen cup home with her and return later this afternoon to drop off specimen for lab. Strict return precautions reviewed with pt in which pt verbalizes understanding and agrees to.

## 2019-11-22 NOTE — ED ADULT NURSE NOTE - OBJECTIVE STATEMENT
Pt presents to ED c/o multiple episodes of diarrhea over the past couple of days, no abdominal pain, no fever/chills, no N/V. Pt recently DC from [facility] s/p x5day admission for perforated appendicitis, received multiple doses IV antibiotics and dc with px of PO antibiotics.

## 2019-11-26 DIAGNOSIS — R19.7 DIARRHEA, UNSPECIFIED: ICD-10-CM

## 2019-11-26 PROCEDURE — 87205 SMEAR GRAM STAIN: CPT

## 2019-11-26 PROCEDURE — 83735 ASSAY OF MAGNESIUM: CPT

## 2019-11-26 PROCEDURE — 71045 X-RAY EXAM CHEST 1 VIEW: CPT

## 2019-11-26 PROCEDURE — 87186 SC STD MICRODIL/AGAR DIL: CPT

## 2019-11-26 PROCEDURE — 81003 URINALYSIS AUTO W/O SCOPE: CPT

## 2019-11-26 PROCEDURE — 84100 ASSAY OF PHOSPHORUS: CPT

## 2019-11-26 PROCEDURE — 87075 CULTR BACTERIA EXCEPT BLOOD: CPT

## 2019-11-26 PROCEDURE — 85025 COMPLETE CBC W/AUTO DIFF WBC: CPT

## 2019-11-26 PROCEDURE — 96374 THER/PROPH/DIAG INJ IV PUSH: CPT | Mod: XU

## 2019-11-26 PROCEDURE — 84484 ASSAY OF TROPONIN QUANT: CPT

## 2019-11-26 PROCEDURE — 36415 COLL VENOUS BLD VENIPUNCTURE: CPT

## 2019-11-26 PROCEDURE — 85027 COMPLETE CBC AUTOMATED: CPT

## 2019-11-26 PROCEDURE — 88304 TISSUE EXAM BY PATHOLOGIST: CPT

## 2019-11-26 PROCEDURE — 74177 CT ABD & PELVIS W/CONTRAST: CPT

## 2019-11-26 PROCEDURE — 85730 THROMBOPLASTIN TIME PARTIAL: CPT

## 2019-11-26 PROCEDURE — 96375 TX/PRO/DX INJ NEW DRUG ADDON: CPT

## 2019-11-26 PROCEDURE — 80053 COMPREHEN METABOLIC PANEL: CPT

## 2019-11-26 PROCEDURE — 87070 CULTURE OTHR SPECIMN AEROBIC: CPT

## 2019-11-26 PROCEDURE — 86900 BLOOD TYPING SEROLOGIC ABO: CPT

## 2019-11-26 PROCEDURE — 99285 EMERGENCY DEPT VISIT HI MDM: CPT | Mod: 25

## 2019-11-26 PROCEDURE — 80048 BASIC METABOLIC PNL TOTAL CA: CPT

## 2019-11-26 PROCEDURE — 83605 ASSAY OF LACTIC ACID: CPT

## 2019-11-26 PROCEDURE — 86901 BLOOD TYPING SEROLOGIC RH(D): CPT

## 2019-11-26 PROCEDURE — 83690 ASSAY OF LIPASE: CPT

## 2019-11-26 PROCEDURE — 85610 PROTHROMBIN TIME: CPT

## 2019-11-26 PROCEDURE — 86850 RBC ANTIBODY SCREEN: CPT

## 2019-11-27 LAB — SURGICAL PATHOLOGY STUDY: SIGNIFICANT CHANGE UP

## 2020-03-05 ENCOUNTER — APPOINTMENT (OUTPATIENT)
Dept: GASTROENTEROLOGY | Facility: CLINIC | Age: 80
End: 2020-03-05

## 2020-06-24 NOTE — H&P ADULT - ASSESSMENT
"  Subjective   Brittani Lambert is a 62 y.o. female who is here today for medication management follow up. You have chosen to receive care through a telephone visit. Do you consent to use a telephone visit for your medical care today? Yes     TIME IN: 1000  TIME OUT: 10:45AM    Chief Complaint: MDD recurrent, THALIA,     History of Present Illness Patient presents via telephone reports she is feeling better emotionally and physically better. She is watching what she eats, no seeds and has helped GI, better appetite. Sleep improved. Tolerating meds well. Anxiety rated a 4 most days. Depression about the same, still has crying episodes \"I'm just more emotional with all this\". Stays motivated and interested in projects, has plants on patio, making a quilt for her daughter and stays engaged with family. Sleeps about 8 hours a night now. She will be having a brain MRI and \"I\"m a little nervous but need it so we shall see how that looks\". States she hasn't had any new symptoms and is being weaned off steriods .  Denies adverse effects from medications.   (Scales based on 0 - 10 with 10 being the worst)    Therapy:  Start Time: 1000   Stop Time: 10:30am    (30 ) minutes was spent for psychotherapy. Assisted patient in processing patient's THALIA, MDD. Acknowledged and normalized patient's thoughts, feelings, and concerns. Utilized cognitive behavioral therapy to assist the patient in recognizing more appropriate coping mechanisms when she becomes agitated/anxious which are proven effective in reducing the severity of frequency of symptoms.     CLINICAL MANUEVERING/INTERVENTION:   Patient talked about current stressors, primarily dealing with met breast cancer.  Feelings were processed and validated, both negative and positive regarding cancer and treatment. Flushing out worries and concerns related to her life was conducted in order to diminish emotional tension. Processing current treatment was conducted. Ways in which patient " may live in the moment  in a purposeful manner was discussed. Patient has gratitude and looks at her blessings. Has niece she is close to coming to stay from out of state for a few weeks and her daughter from [place] and grandson will be coming in July as well, so she is very pleased with expectation. The patient expressed gratitude for today's session and said that counseling helps her feel better.      The following portions of the patient's history were reviewed and updated as appropriate: allergies, current medications, past family history, past medical history, past social history, past surgical history and problem list.    Review of Systems  A 14 point review of systems was performed and is negative except as noted above.    Objective   Physical Exam  not currently breastfeeding.    No Known Allergies    Current Medications:   Current Outpatient Medications   Medication Sig Dispense Refill   • albuterol (PROAIR RESPICLICK) 108 (90 Base) MCG/ACT inhaler Inhale 2 puffs Every 6 (Six) Hours As Needed for Wheezing or Shortness of Air. 1 inhaler 11   • ALPRAZolam (XANAX) 0.5 MG tablet Take 1 tablet by mouth 3 (Three) Times a Day As Needed for Anxiety or Sleep. 90 tablet 0   • ARIPiprazole (ABILIFY) 5 MG tablet Take 1 tablet by mouth Daily. 30 tablet 2   • dexamethasone (DECADRON) 1 MG tablet Take 0.5 tablets by mouth Daily With Breakfast. 120 tablet 3   • lidocaine-prilocaine (EMLA) 2.5-2.5 % cream PLEASE SEE ATTACHED FOR DETAILED DIRECTIONS 30 g 3   • ondansetron (ZOFRAN) 8 MG tablet Take 1 tablet by mouth 3 (Three) Times a Day As Needed for Nausea or Vomiting. 30 tablet 0   • oxyCODONE-acetaminophen (Percocet) 7.5-325 MG per tablet Take 1 tablet by mouth Every 6 (Six) Hours As Needed for Moderate Pain . 60 tablet 0   • pravastatin (PRAVACHOL) 20 MG tablet TAKE 1 TABLET BY MOUTH EVERY DAY AT NIGHT 30 tablet 5   • venlafaxine XR (EFFEXOR-XR) 150 MG 24 hr capsule        No current facility-administered medications for  this visit.        Lab Results:  Hospital Outpatient Visit on 05/08/2020   Component Date Value Ref Range Status   • Creatinine 05/08/2020 0.70  0.60 - 1.30 mg/dL Final    Serial Number: [id number]:  [id number]       Appearance:   Hygiene:   Reports good  Cooperation:  Cooperative  Eye Contact:    Psychomotor Behavior:  denies psychomotor agitation/retardation, No EPS, No motor tics  Mood:  Less depressed, less anxious   Affect:    Hopelessness: Denies  Speech:  Normal and animated   Thought Process:  Linear  Thought Content:  Normal  Concentration: Normal   Suicidal:  None  Homicidal:  None  Hallucinations:  None  Delusion:  None  Memory:  Intact  Orientation:  Person, Place, Time and Situation  Reliability:  good  Insight: good  Judgement: good  Impulse Control: good  Estimated Intelligence: average range    ZECHARIAH REVIEWED NO RED FLAGS last reviewed 6/10/20 no red flags    Assessment/Plan   Diagnoses and all orders for this visit:    Moderate episode of recurrent major depressive disorder (CMS/HCC)    Generalized anxiety disorder  -     ALPRAZolam (XANAX) 0.5 MG tablet; Take 1 tablet by mouth 3 (Three) Times a Day As Needed for Anxiety or Sleep.       IMPRESSION: pt reports feeling better, eating better, sleeping better, mood improved    PLAN:   Cont Abilify 5mg daily  For depression   Cont venlafaxine XR 150mg po daily , for  depression anxiety  Refill alprazolam 0.5mg TID as needed for anxiety     We discussed risks, benefits, and side effects of the above medications and the patient was agreeable with the plan. Patient was educated on the importance of compliance with treatment and follow-up appointments.     Provide Cognitive Behavioral Therapy and Solution Focused Therapy to improve functioning, maintain stability, and avoid decompensation and the need for higher level of care.    Counseled patient regarding multimodal approach with encouragement of healthy nutrition, healthy sleep, regular physical  mobility, social involvement, counseling, and medication compliance.     Assisted patient in identifying risk factors which would indicate the need for higher level of care including thoughts to harm self or others and/or self-harming behavior and encouraged patient to contact this office, call 911, or present to the nearest emergency room should any of these events occur. Discussed crisis intervention services and means to access.  Patient adamantly and convincingly denies current suicidal or homicidal ideation or perceptual disturbance.    Treatment Plan: stabilize mood, patient will stay out of psychiatric hospital and be at optimal level of functioning with therapy and take all medication as prescribed. Patient verbalized  understanding and agreement to plan.    Instructed to call for questions or concerns and return early if necessary.     Return in about 3 weeks (around 7/15/2020).            78F w/ PMHx BHUPENDRA-BSO p/w abd pain x 1 day found to have acute appendicitis on CT    NPO/IVF  IV abx  Pain/nausea control  OOBA/IS  SCDs/SQH  Pre op for laparoscopic appendectomy

## 2020-06-30 ENCOUNTER — APPOINTMENT (OUTPATIENT)
Dept: PULMONOLOGY | Facility: CLINIC | Age: 80
End: 2020-06-30
Payer: MEDICARE

## 2020-06-30 VITALS
DIASTOLIC BLOOD PRESSURE: 77 MMHG | BODY MASS INDEX: 31.05 KG/M2 | TEMPERATURE: 98.1 F | HEIGHT: 65 IN | HEART RATE: 54 BPM | WEIGHT: 186.38 LBS | OXYGEN SATURATION: 98 % | SYSTOLIC BLOOD PRESSURE: 133 MMHG

## 2020-06-30 DIAGNOSIS — Z80.3 FAMILY HISTORY OF MALIGNANT NEOPLASM OF BREAST: ICD-10-CM

## 2020-06-30 DIAGNOSIS — G47.33 OBSTRUCTIVE SLEEP APNEA (ADULT) (PEDIATRIC): ICD-10-CM

## 2020-06-30 DIAGNOSIS — Z80.42 FAMILY HISTORY OF MALIGNANT NEOPLASM OF PROSTATE: ICD-10-CM

## 2020-06-30 DIAGNOSIS — Z82.3 FAMILY HISTORY OF STROKE: ICD-10-CM

## 2020-06-30 DIAGNOSIS — R06.83 SNORING: ICD-10-CM

## 2020-06-30 PROCEDURE — 99204 OFFICE O/P NEW MOD 45 MIN: CPT

## 2020-06-30 RX ORDER — LATANOPROST/PF 0.005 %
0.01 DROPS OPHTHALMIC (EYE)
Refills: 0 | Status: ACTIVE | COMMUNITY

## 2020-06-30 RX ORDER — TIMOLOL MALEATE 5 MG/ML
0.5 SOLUTION/ DROPS OPHTHALMIC
Refills: 0 | Status: ACTIVE | COMMUNITY

## 2020-06-30 NOTE — PHYSICAL EXAM
[Normal Appearance] : normal appearance [General Appearance - Well Developed] : well developed [Well Groomed] : well groomed [General Appearance - In No Acute Distress] : no acute distress [No Deformities] : no deformities [General Appearance - Well Nourished] : well nourished [Normal Conjunctiva] : the conjunctiva exhibited no abnormalities [III] : III [Neck Appearance] : the appearance of the neck was normal [Apical Impulse] : the apical impulse was normal [Heart Rate And Rhythm] : heart rate was normal and rhythm regular [] : no respiratory distress [Exaggerated Use Of Accessory Muscles For Inspiration] : no accessory muscle use [Respiration, Rhythm And Depth] : normal respiratory rhythm and effort [Auscultation Breath Sounds / Voice Sounds] : lungs were clear to auscultation bilaterally [Abnormal Walk] : normal gait [Nail Clubbing] : no clubbing of the fingernails [Involuntary Movements] : no involuntary movements were seen [Cyanosis, Localized] : no localized cyanosis [No Focal Deficits] : no focal deficits [Oriented To Time, Place, And Person] : oriented to person, place, and time [Affect] : the affect was normal [Impaired Insight] : insight and judgment were intact [Mood] : the mood was normal

## 2020-06-30 NOTE — HISTORY OF PRESENT ILLNESS
[FreeTextEntry1] : 80yo with hx of EBONI referred for EBONI management Stopped CPAP use in 2018 as she found it cumbersome and hard to use. Severity of EBONI at that time is unknown. Recently she has noted apneas and snoring, and this is concerning to her and her wife.\par  [Snoring] : snoring [Witnessed Apneas] : witnessed sleep apnea [Daytime Somnolence] : daytime somnolence [Unintentional Sleep While Inactive] : no unintentional sleep while inactive [Awakes Unrefreshed] : awakening unrefreshed [Recent  Weight Gain] : no recent weight gain

## 2020-06-30 NOTE — ASSESSMENT
[FreeTextEntry1] : 78yo with hx of EBONI referred by Dr. Desire Pineda for EBONI management. Ms. Parker has been more symptomatic in the past few years with progressive snoring, apneas and excessive daytime sleepiness. She is indicated for sleep apnea testing. She was referred to the [facility] for an HST. The ramifications of EBONI and its treatment were discussed in detail. Ms. Parker will follow up after the HST is resulted.

## 2020-06-30 NOTE — REVIEW OF SYSTEMS
[Difficulty Maintaining Sleep] : no difficulty maintaining sleep [Difficulty Initiating Sleep] : no difficulty falling asleep [Late day/ Evening symptoms] : no late day/evening symptoms [Lower Extremity Discomfort] : no lower extremity discomfort [Cataplexy] :  no cataplexy [Unusual Sleep Behavior] : no unusual sleep behavior [Hypersomnolence] : not sleeping much more than usual [Negative] : Musculoskeletal

## 2020-06-30 NOTE — CONSULT LETTER
[Dear  ___] : Dear  [unfilled], [Consult Letter:] : I had the pleasure of evaluating your patient, [unfilled]. [Consult Closing:] : Thank you very much for allowing me to participate in the care of this patient.  If you have any questions, please do not hesitate to contact me. [Please see my note below.] : Please see my note below. [FreeTextEntry3] : Taylor Felix MD\par \par [employer] [facility]\par Pulmonary, Critical Care, and Sleep Medicine\par  [Sincerely,] : Sincerely,

## 2020-07-13 ENCOUNTER — APPOINTMENT (OUTPATIENT)
Dept: SLEEP CENTER | Facility: HOME HEALTH | Age: 80
End: 2020-07-13
Payer: MEDICARE

## 2020-07-13 ENCOUNTER — OUTPATIENT (OUTPATIENT)
Dept: OUTPATIENT SERVICES | Facility: HOSPITAL | Age: 80
LOS: 1 days | End: 2020-07-13
Payer: MEDICARE

## 2020-07-13 DIAGNOSIS — Z90.49 ACQUIRED ABSENCE OF OTHER SPECIFIED PARTS OF DIGESTIVE TRACT: Chronic | ICD-10-CM

## 2020-07-13 DIAGNOSIS — Z90.710 ACQUIRED ABSENCE OF BOTH CERVIX AND UTERUS: Chronic | ICD-10-CM

## 2020-07-13 PROCEDURE — G0400: CPT | Mod: 26

## 2020-07-14 DIAGNOSIS — G47.33 OBSTRUCTIVE SLEEP APNEA (ADULT) (PEDIATRIC): ICD-10-CM

## 2020-07-14 PROCEDURE — 95800 SLP STDY UNATTENDED: CPT

## 2020-07-20 ENCOUNTER — APPOINTMENT (OUTPATIENT)
Dept: PULMONOLOGY | Facility: CLINIC | Age: 80
End: 2020-07-20
Payer: MEDICARE

## 2020-07-20 ENCOUNTER — TRANSCRIPTION ENCOUNTER (OUTPATIENT)
Age: 80
End: 2020-07-20

## 2020-07-20 VITALS
DIASTOLIC BLOOD PRESSURE: 77 MMHG | BODY MASS INDEX: 30.82 KG/M2 | OXYGEN SATURATION: 98 % | TEMPERATURE: 97.9 F | WEIGHT: 185 LBS | HEIGHT: 65 IN | HEART RATE: 51 BPM | SYSTOLIC BLOOD PRESSURE: 146 MMHG

## 2020-07-20 DIAGNOSIS — G47.33 OBSTRUCTIVE SLEEP APNEA (ADULT) (PEDIATRIC): ICD-10-CM

## 2020-07-20 PROCEDURE — 99214 OFFICE O/P EST MOD 30 MIN: CPT

## 2020-07-20 NOTE — REVIEW OF SYSTEMS
[Negative] : Psychiatric [EDS: ESS=____] : daytime somnolence: ESS=[unfilled] [Obesity] : obesity [Difficulty Initiating Sleep] : no difficulty falling asleep [Late day/ Evening symptoms] : no late day/evening symptoms [Unusual Sleep Behavior] : no unusual sleep behavior [Lower Extremity Discomfort] : no lower extremity discomfort [Hypersomnolence] : not sleeping much more than usual [Cataplexy] :  no cataplexy

## 2020-07-20 NOTE — ASSESSMENT
[FreeTextEntry1] : 78 yo F w/ hx of EBONI presenting for EBONI management. HST showed moderate EBONI. The benefits of EBONI treatment in improving cardiac, neurologic, cognitive, and mortality outcomes were discussed. PAP and mandibular advancement therapy were discussed in detail. Ms. Parker would like to pursue PAP therapy. Equipment ordered; DME is [employer]; will have mask fitting at time of ; APAP set 5 to 20 cm H2O. Adherence goal is at least 4 hours of use per night on 70% of nights with an AHI of less than 10 events per hour. The ramifications of EBONI and its treatment were discussed in detail. Follow up within 6 weeks of use or sooner if needed.

## 2020-07-20 NOTE — PHYSICAL EXAM
[General Appearance - Well Developed] : well developed [Normal Appearance] : normal appearance [Well Groomed] : well groomed [General Appearance - Well Nourished] : well nourished [Normal Conjunctiva] : the conjunctiva exhibited no abnormalities [General Appearance - In No Acute Distress] : no acute distress [No Deformities] : no deformities [Normal Oropharynx] : normal oropharynx [Eyelids - No Xanthelasma] : the eyelids demonstrated no xanthelasmas [Neck Appearance] : the appearance of the neck was normal [Neck Cervical Mass (___cm)] : no neck mass was observed [Thyroid Diffuse Enlargement] : the thyroid was not enlarged [Thyroid Nodule] : there were no palpable thyroid nodules [Jugular Venous Distention Increased] : there was no jugular-venous distention [Heart Rate And Rhythm] : heart rate was normal and rhythm regular [Murmurs] : no murmurs [Heart Sounds Gallop] : no gallops [Heart Sounds] : normal S1 and S2 [Heart Sounds Pericardial Friction Rub] : no pericardial rub [Auscultation Breath Sounds / Voice Sounds] : lungs were clear to auscultation bilaterally [Bowel Sounds] : normal bowel sounds [Abdomen Tenderness] : non-tender [Abdomen Soft] : soft [Abdomen Mass (___ Cm)] : no abdominal mass palpated [Cyanosis, Localized] : no localized cyanosis [Nail Clubbing] : no clubbing of the fingernails [Skin Color & Pigmentation] : normal skin color and pigmentation [Petechial Hemorrhages (___cm)] : no petechial hemorrhages [Skin Turgor] : normal skin turgor [] : no rash [Impaired Insight] : insight and judgment were intact [No Focal Deficits] : no focal deficits [Oriented To Time, Place, And Person] : oriented to person, place, and time [Mood] : the mood was normal [Affect] : the affect was normal

## 2020-07-20 NOTE — HISTORY OF PRESENT ILLNESS
[Snoring] : snoring [FreeTextEntry1] : 78 yo with hx of EBONI referred for EBONI management. She was diagnosed with it in 2018 and was given a CPAP machine at that time. but she found it cumbersome to use and returned it. She was alerted by her partner recently that she had worsening snoring and apneas, which prompted her initial visit. \par She has now completed an HST, which she is here to discuss the results of. \par Her sleep patterns have not changed but due to concerns of back pain, she has been training to sleep on her back (as opposed to her side or prone). She has been told that her snoring has become worse after. She has been taking some naps during the daytime lasting about 10 minutes. No other changes in her sleep habits.  [Witnessed Apneas] : witnessed sleep apnea [Daytime Somnolence] : daytime somnolence [Unintentional Sleep While Inactive] : no unintentional sleep while inactive [Awakes Unrefreshed] : awakening unrefreshed [Date: ___] : Date of most recent diagnostic polysomnogram: [unfilled] [Recent  Weight Gain] : no recent weight gain [T90%: ___] : T90%: [unfilled]% [AHI: ___ per hour] : Apnea-hypopnea index:  [unfilled] per hour [Murtaza desatuation%: ___] : Murtaza desaturation:  [unfilled]%

## 2021-08-04 ENCOUNTER — NON-APPOINTMENT (OUTPATIENT)
Age: 81
End: 2021-08-04

## 2021-08-04 ENCOUNTER — APPOINTMENT (OUTPATIENT)
Dept: OPHTHALMOLOGY | Facility: CLINIC | Age: 81
End: 2021-08-04

## 2022-01-06 ENCOUNTER — NON-APPOINTMENT (OUTPATIENT)
Age: 82
End: 2022-01-06

## 2022-01-06 ENCOUNTER — APPOINTMENT (OUTPATIENT)
Dept: OPHTHALMOLOGY | Facility: CLINIC | Age: 82
End: 2022-01-06

## 2022-04-11 ENCOUNTER — APPOINTMENT (OUTPATIENT)
Dept: UROLOGY | Facility: CLINIC | Age: 82
End: 2022-04-11
Payer: MEDICARE

## 2022-04-11 VITALS
TEMPERATURE: 98 F | HEART RATE: 58 BPM | OXYGEN SATURATION: 97 % | DIASTOLIC BLOOD PRESSURE: 78 MMHG | SYSTOLIC BLOOD PRESSURE: 130 MMHG

## 2022-04-11 DIAGNOSIS — R35.0 FREQUENCY OF MICTURITION: ICD-10-CM

## 2022-04-11 PROCEDURE — 99204 OFFICE O/P NEW MOD 45 MIN: CPT

## 2022-04-11 PROCEDURE — 51798 US URINE CAPACITY MEASURE: CPT

## 2022-04-12 ENCOUNTER — RESULT CHARGE (OUTPATIENT)
Age: 82
End: 2022-04-12

## 2022-04-12 LAB
APPEARANCE: CLEAR
BACTERIA: NEGATIVE
BILIRUB UR QL STRIP: NORMAL
BILIRUBIN URINE: NEGATIVE
BLOOD URINE: NEGATIVE
CLARITY UR: CLEAR
COLLECTION METHOD: NORMAL
COLOR: NORMAL
GLUCOSE QUALITATIVE U: NEGATIVE
GLUCOSE UR-MCNC: NORMAL
HCG UR QL: 0.2 EU/DL
HGB UR QL STRIP.AUTO: NORMAL
HYALINE CASTS: 2 /LPF
KETONES UR-MCNC: NORMAL
KETONES URINE: NEGATIVE
LEUKOCYTE ESTERASE UR QL STRIP: NORMAL
LEUKOCYTE ESTERASE URINE: NEGATIVE
MICROSCOPIC-UA: NORMAL
NITRITE UR QL STRIP: NORMAL
NITRITE URINE: NEGATIVE
PH UR STRIP: 6
PH URINE: 6
PROT UR STRIP-MCNC: NORMAL
PROTEIN URINE: NEGATIVE
RED BLOOD CELLS URINE: 2 /HPF
SP GR UR STRIP: 1.02
SPECIFIC GRAVITY URINE: 1.01
SQUAMOUS EPITHELIAL CELLS: 1 /HPF
UROBILINOGEN URINE: NORMAL
WHITE BLOOD CELLS URINE: 2 /HPF

## 2022-04-12 NOTE — ASSESSMENT
[FreeTextEntry1] : 81 year old female with urinary frequency/urgency and urge incontinence.   I have sent her urine for culture and started her on a trial or Mirabegron 25mg.  She will see me for a follow-up appt in 1 month.

## 2022-04-12 NOTE — HISTORY OF PRESENT ILLNESS
[FreeTextEntry1] : 80 yo  with urinary frequency/urgency and urge incontinence for the last several years.  She wears pads for protection.  She has not tried any medications for this.  No significant UTI history.  \par \par She is not sexually active. \par \par PMH: none \par PSH: appy 2019, hernia/adelina , hysterectomy, (fibroids) \par SH: non-smoker, min EtOH\par \par

## 2022-04-13 LAB — BACTERIA UR CULT: NORMAL

## 2022-04-14 ENCOUNTER — NON-APPOINTMENT (OUTPATIENT)
Age: 82
End: 2022-04-14

## 2022-04-14 ENCOUNTER — APPOINTMENT (OUTPATIENT)
Dept: OPHTHALMOLOGY | Facility: CLINIC | Age: 82
End: 2022-04-14

## 2022-05-11 ENCOUNTER — APPOINTMENT (OUTPATIENT)
Dept: UROLOGY | Facility: CLINIC | Age: 82
End: 2022-05-11

## 2022-07-18 NOTE — ED ADULT NURSE NOTE - NS ED NURSE RECORD ANOTHER HT AND WT
Yes
pt tolerating sandwiches, tuna, chicken salad. Menu procedure explained, also pt was given phone number to kitchen to place meal orders, currently  pt is on   on isolation for covid

## 2022-10-03 ENCOUNTER — APPOINTMENT (OUTPATIENT)
Dept: OPHTHALMOLOGY | Facility: CLINIC | Age: 82
End: 2022-10-03

## 2022-10-03 ENCOUNTER — NON-APPOINTMENT (OUTPATIENT)
Age: 82
End: 2022-10-03

## 2022-10-11 ENCOUNTER — APPOINTMENT (OUTPATIENT)
Dept: OPHTHALMOLOGY | Facility: CLINIC | Age: 82
End: 2022-10-11

## 2022-11-16 ENCOUNTER — APPOINTMENT (OUTPATIENT)
Dept: UROLOGY | Facility: CLINIC | Age: 82
End: 2022-11-16

## 2022-11-16 VITALS
OXYGEN SATURATION: 95 % | SYSTOLIC BLOOD PRESSURE: 146 MMHG | TEMPERATURE: 97.8 F | HEART RATE: 54 BPM | RESPIRATION RATE: 17 BRPM | DIASTOLIC BLOOD PRESSURE: 65 MMHG

## 2022-11-16 LAB
BILIRUB UR QL STRIP: NORMAL
CLARITY UR: CLEAR
COLLECTION METHOD: NORMAL
GLUCOSE UR-MCNC: NORMAL
HCG UR QL: 0.2 EU/DL
HGB UR QL STRIP.AUTO: NORMAL
KETONES UR-MCNC: NORMAL
LEUKOCYTE ESTERASE UR QL STRIP: NORMAL
NITRITE UR QL STRIP: NORMAL
PH UR STRIP: 5.5
PROT UR STRIP-MCNC: NORMAL
SP GR UR STRIP: 1.02

## 2022-11-16 PROCEDURE — 51798 US URINE CAPACITY MEASURE: CPT

## 2022-11-16 PROCEDURE — 99213 OFFICE O/P EST LOW 20 MIN: CPT

## 2022-11-17 NOTE — HISTORY OF PRESENT ILLNESS
[FreeTextEntry1] : Pt is a 80 yo F with urinary frequency/urgency and urge incontinence who presents today for a follow-up visit. At her last visit on 22, she was prescribed Myrbetriq 25mg. Today she reports that she never started Myrbetriq because it was too expensive - it would have been $300/month through Medicare. \par \par Pt states that she is voiding q 20 minutes at home, but can go 6 hours without voiding when she is outside the house. 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 However, she recently experienced her first ever episode of urinary incontinence while at work.  \par Udip: (+) small leuks\par PVR: 0 cc (to rule out incomplete bladder emptying)\par  \par 22-- 80 yo  with urinary frequency/urgency and urge incontinence for the last several years.  She wears pads for protection.  She has not tried any medications for this.  No significant UTI history.  \par \par She is not sexually active. \par \par PMH: none \par PSH: appy 2019, hernia/adelina , hysterectomy, (fibroids) \par SH: non-smoker, min EtOH\par \par

## 2022-11-17 NOTE — ASSESSMENT
[FreeTextEntry1] : Pt is a 82 yo F with urinary frequency/urgency and urge incontinence who presents today for a follow-up visit. At her last visit on 4/11/22, she was prescribed Myrbetriq 25mg. Today she reports that she never started Myrbetriq because it was too expensive - it would have been $300/month through Medicare. \par \par Pt states that she is voiding q 20 minutes at home, but can go 6 hours without voiding when she is outside the house. \par \par I offered her a trial of Gemtesa, but she would first like to undergo a urodynamics evaluation. \par \par Urine was sent for culture. \par \par Patient expressed understanding.\par \par \par

## 2022-11-17 NOTE — LETTER BODY
[Dear  ___] : Dear  [unfilled], [Consult Letter:] : I had the pleasure of evaluating your patient, [unfilled]. [Please see my note below.] : Please see my note below. [Consult Closing:] : Thank you very much for allowing me to participate in the care of this patient.  If you have any questions, please do not hesitate to contact me. [Sincerely,] : Sincerely, [FreeTextEntry3] : Dr. Kori Elizabeth\par

## 2022-11-17 NOTE — ADDENDUM
[FreeTextEntry1] : A portion of this note was written by [Becki Figueroa] on 11/16/2022  acting as a scribe for Dr. Elizabeth. \par \par I have personally reviewed the chart and agree that the record accurately reflects my personal performance and the history, physical exam, assessment, and plan.\par

## 2022-11-23 LAB — BACTERIA UR CULT: NORMAL

## 2022-12-15 ENCOUNTER — APPOINTMENT (OUTPATIENT)
Dept: UROLOGY | Facility: CLINIC | Age: 82
End: 2022-12-15
Payer: MEDICARE

## 2022-12-15 VITALS
HEIGHT: 65 IN | SYSTOLIC BLOOD PRESSURE: 136 MMHG | TEMPERATURE: 97.2 F | OXYGEN SATURATION: 99 % | HEART RATE: 52 BPM | WEIGHT: 185 LBS | BODY MASS INDEX: 30.82 KG/M2 | DIASTOLIC BLOOD PRESSURE: 83 MMHG

## 2022-12-15 PROCEDURE — 51741 ELECTRO-UROFLOWMETRY FIRST: CPT

## 2022-12-15 PROCEDURE — 51728 CYSTOMETROGRAM W/VP: CPT

## 2022-12-15 PROCEDURE — 51784 ANAL/URINARY MUSCLE STUDY: CPT

## 2022-12-15 PROCEDURE — 51798 US URINE CAPACITY MEASURE: CPT

## 2022-12-15 PROCEDURE — 51797 INTRAABDOMINAL PRESSURE TEST: CPT

## 2022-12-15 PROCEDURE — 81002 URINALYSIS NONAUTO W/O SCOPE: CPT

## 2022-12-20 NOTE — ADDENDUM
[FreeTextEntry1] : A portion of this note was written by [Becki Figueroa] on 12/15/2022  acting as a scribe for Dr. Elizabeth. \par \par I have personally reviewed the chart and agree that the record accurately reflects my personal performance and the history, physical exam, assessment, and plan.\par

## 2022-12-20 NOTE — HISTORY OF PRESENT ILLNESS
[FreeTextEntry1] : Pt is an 81 yo F with urinary frequency/urgency and urge incontinence who presents today for a urodynamics evaluation. She was prescribed Myrbetriq 25mg in the past, but never started the medication because it was too expensive through Medicare.  \par \par Pt reports that yesterday she experienced two episodes of urge incontinence while trying to get to the restroom. She states that when she experiences urinary urgency, she only has a few seconds to make it to the restroom.\par \par Udip: (+) trace leuks  \par \par 22-- Pt is a 80 yo F with urinary frequency/urgency and urge incontinence who presents today for a follow-up visit. At her last visit on 22, she was prescribed Myrbetriq 25mg. Today she reports that she never started Myrbetriq because it was too expensive - it would have been $300/month through Medicare. \par \par Pt states that she is voiding q 20 minutes at home, but can go 6 hours without voiding when she is outside the house. 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 However, she recently experienced her first ever episode of urinary incontinence while at work.  \par Udip: (+) small leuks\par PVR: 0 cc (to rule out incomplete bladder emptying)\par  \par 22-- 80 yo  with urinary frequency/urgency and urge incontinence for the last several years.  She wears pads for protection.  She has not tried any medications for this.  No significant UTI history.  \par \par She is not sexually active. \par \par PMH: none \par PSH: appy 2019, hernia/adelina , hysterectomy, (fibroids) \par SH: non-smoker, min EtOH\par \par

## 2022-12-20 NOTE — ASSESSMENT
[FreeTextEntry1] : Pt is an 83 yo F with urinary frequency/urgency and urge incontinence who presents today for a urodynamics evaluation. She was prescribed Myrbetriq 25mg in the past, but never started the medication because it was too expensive through Medicare.  \par \par Pt reports that she continues to experience urinary urgency, and yesterday she experienced two episodes of urge incontinence while trying to get to the restroom. \par \par Today's urodynamics revealed a stable bladder with normal capacity.  She has impaired contractility.  Because of this, I do not advise her to undergo intravesical botox. \par \par We reviewed bladder irritants to avoid, including caffeine, alcohol, and artificial sweeteners. \par \par Pt would like to try an oral medication for her urinary symptoms. We had a discussion regarding beta agonist versus anticholinergic medications and their respective possible side effects. \par \par I sent a prescription for Gemtesa to her pharmacy - if Gemtesa is cost prohibitive, she will contact our office and I will send her a prescription for Trospium instead. \par \par Pt is also interested in trying PFPT - she lives in [place] and would like to go to a PT close to her home. I provided her with a referral for PFPT, and advised her to look into the [facility]. \par \par Urine was sent for culture. \par \par Patient expressed understanding.

## 2022-12-21 LAB
BACTERIA UR CULT: NORMAL
BILIRUB UR QL STRIP: NORMAL
CLARITY UR: CLEAR
COLLECTION METHOD: NORMAL
GLUCOSE UR-MCNC: NORMAL
HCG UR QL: 0.2 EU/DL
HGB UR QL STRIP.AUTO: NORMAL
KETONES UR-MCNC: NORMAL
LEUKOCYTE ESTERASE UR QL STRIP: NORMAL
NITRITE UR QL STRIP: NORMAL
PH UR STRIP: 5.5
PROT UR STRIP-MCNC: NORMAL
SP GR UR STRIP: <1.005

## 2023-01-10 ENCOUNTER — NON-APPOINTMENT (OUTPATIENT)
Age: 83
End: 2023-01-10

## 2023-01-10 RX ORDER — VIBEGRON 75 MG/1
75 TABLET, FILM COATED ORAL
Qty: 30 | Refills: 1 | Status: DISCONTINUED | COMMUNITY
Start: 2022-12-15 | End: 2023-01-10

## 2023-01-10 RX ORDER — MIRABEGRON 25 MG/1
25 TABLET, FILM COATED, EXTENDED RELEASE ORAL
Qty: 30 | Refills: 11 | Status: ACTIVE | COMMUNITY
Start: 2022-04-11 | End: 1900-01-01

## 2023-01-24 ENCOUNTER — APPOINTMENT (OUTPATIENT)
Dept: UROLOGY | Facility: CLINIC | Age: 83
End: 2023-01-24
Payer: MEDICARE

## 2023-01-24 VITALS
SYSTOLIC BLOOD PRESSURE: 140 MMHG | HEART RATE: 64 BPM | OXYGEN SATURATION: 98 % | DIASTOLIC BLOOD PRESSURE: 71 MMHG | TEMPERATURE: 97.3 F

## 2023-01-24 PROCEDURE — 81003 URINALYSIS AUTO W/O SCOPE: CPT | Mod: QW

## 2023-01-24 PROCEDURE — 99214 OFFICE O/P EST MOD 30 MIN: CPT

## 2023-01-24 PROCEDURE — 51798 US URINE CAPACITY MEASURE: CPT

## 2023-01-25 NOTE — ASSESSMENT
[FreeTextEntry1] : Pt is an 81 yo F with urinary frequency/urgency and urge incontinence,for which she is taking Myrbetriq 25mg. UDS evaluation from 12/15/22 showed Pt has impaired bladder contractility.  Pt reports she has been taking Myrbetriq for two weeks, and is voiding 5-6x/day. Since starting the medication, she has only experienced one episode of urinary urgency. \par \par Pt has noticed that her blood pressure varies significantly, but is unsure if this is a side effect of Myrbetriq.  According to my records, it does not seem to correlate with starting Myrbetriq, but I will defer to Dr. Pineda.   \par For now, I have sent a 1 month supply to her pharmacy.  She can contact me for refills should she decide to remain on the Rx.  I look forward to seeing her back in 1 year, or earlier if any issues arise.  \par \par Patient expressed understanding.

## 2023-01-25 NOTE — HISTORY OF PRESENT ILLNESS
[FreeTextEntry1] : Pt is an 81 yo F with urinary frequency/urgency and urge incontinence, for which she is taking Myrbetriq 25mg. UDS evaluation from 12/15/22 showed Pt has impaired bladder contractility. She presents today for a follow-up visit to assess her progress on Myrbetriq. Pt reports she has been taking Myrbetriq for two weeks, and is voiding 5-6x/day. Since starting the medication, she has only experienced one episode of urinary urgency. \par \par Pt has noticed that her blood pressure varies significantly, but is unsure if this is a side effect of Myrbretriq. \par - BP from 23 = 141/71\par - BP from  23 = 130/78 around 8am, 144/84 around 9am, 140/71 at 3:30pm \par - BP from 22 = 146/65\par - BP from 20 = 146/77 \par \par Pt plans on meeting with her PCP Dr. Desire Pineda in 2023.   BP does not seem to be related to when started Myrbetriq, however I will defer to Dr. Pineda. \par \par Udip: (+) small leuks \par PVR: 0 cc (to rule out incomplete bladder emptying)\par \par 12/15/22-- Pt is an 81 yo F with urinary frequency/urgency and urge incontinence who presents today for a urodynamics evaluation. She was prescribed Myrbetriq 25mg in the past, but never started the medication because it was too expensive through Medicare.  \par \par Pt reports that yesterday she experienced two episodes of urge incontinence while trying to get to the restroom. She states that when she experiences urinary urgency, she only has a few seconds to make it to the restroom.\par \par Udip: (+) trace leuks  \par \par 22-- Pt is a 82 yo F with urinary frequency/urgency and urge incontinence who presents today for a follow-up visit. At her last visit on 22, she was prescribed Myrbetriq 25mg. Today she reports that she never started Myrbetriq because it was too expensive - it would have been $300/month through Medicare. \par \par Pt states that she is voiding q 20 minutes at home, but can go 6 hours without voiding when she is outside the house. 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 However, she recently experienced her first ever episode of urinary incontinence while at work.  \par Udip: (+) small leuks\par PVR: 0 cc (to rule out incomplete bladder emptying)\par  \par 22-- 82 yo  with urinary frequency/urgency and urge incontinence for the last several years.  She wears pads for protection.  She has not tried any medications for this.  No significant UTI history.  \par \par She is not sexually active. \par \par PMH: none \par PSH: appy 2019, hernia/adelina , hysterectomy, (fibroids) \par SH: non-smoker, min EtOH\par \par

## 2023-01-25 NOTE — ADDENDUM
[FreeTextEntry1] : A portion of this note was written by [Becki Figueroa] on 01/24/2023 acting as a scribe for Dr. Elizabeth. \par \par I have personally reviewed the chart and agree that the record accurately reflects my personal performance and the history, physical exam, assessment, and plan.\par

## 2023-01-26 LAB
BILIRUB UR QL STRIP: NORMAL
CLARITY UR: CLEAR
COLLECTION METHOD: NORMAL
GLUCOSE UR-MCNC: NORMAL
HCG UR QL: 0.2 EU/DL
HGB UR QL STRIP.AUTO: NORMAL
KETONES UR-MCNC: NORMAL
LEUKOCYTE ESTERASE UR QL STRIP: NORMAL
NITRITE UR QL STRIP: NORMAL
PH UR STRIP: 6
PROT UR STRIP-MCNC: NORMAL
SP GR UR STRIP: 1.03

## 2023-02-27 NOTE — H&P ADULT - NSHPPHYSICALEXAM_GEN_ALL_CORE
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 3.1 Pt wants to know what he needs to do next. I&O's Detail      Vital Signs Last 24 Hrs  T(C): 36.6 (14 Nov 2019 16:59), Max: 36.6 (14 Nov 2019 16:59)  T(F): 97.8 (14 Nov 2019 16:59), Max: 97.8 (14 Nov 2019 16:59)  HR: 62 (14 Nov 2019 16:59) (56 - 62)  BP: 144/73 (14 Nov 2019 16:59) (130/78 - 144/73)  BP(mean): --  RR: 18 (14 Nov 2019 16:59) (18 - 18)  SpO2: 96% (14 Nov 2019 16:59) (96% - 97%)    General: NAD, resting comfortably in bed  C/V: NSR  Pulm: Nonlabored breathing, no respiratory distress  Abd: soft, nondistended, mild TTP in RLQ, no rebound/guarding

## 2023-04-04 ENCOUNTER — APPOINTMENT (OUTPATIENT)
Dept: OPHTHALMOLOGY | Facility: CLINIC | Age: 83
End: 2023-04-04

## 2023-04-04 ENCOUNTER — NON-APPOINTMENT (OUTPATIENT)
Age: 83
End: 2023-04-04

## 2023-06-08 RX ORDER — MIRABEGRON 25 MG/1
25 TABLET, FILM COATED, EXTENDED RELEASE ORAL
Qty: 90 | Refills: 3 | Status: ACTIVE | COMMUNITY
Start: 2023-01-24 | End: 1900-01-01

## 2023-06-26 RX ORDER — MIRABEGRON 25 MG/1
25 TABLET, FILM COATED, EXTENDED RELEASE ORAL
Qty: 90 | Refills: 3 | Status: ACTIVE | COMMUNITY
Start: 2023-06-26 | End: 1900-01-01

## 2023-10-10 ENCOUNTER — APPOINTMENT (OUTPATIENT)
Dept: OPHTHALMOLOGY | Facility: CLINIC | Age: 83
End: 2023-10-10

## 2023-10-10 ENCOUNTER — NON-APPOINTMENT (OUTPATIENT)
Age: 83
End: 2023-10-10

## 2024-02-13 ENCOUNTER — NON-APPOINTMENT (OUTPATIENT)
Age: 84
End: 2024-02-13

## 2024-02-13 ENCOUNTER — APPOINTMENT (OUTPATIENT)
Dept: OPHTHALMOLOGY | Facility: CLINIC | Age: 84
End: 2024-02-13

## 2024-04-03 ENCOUNTER — APPOINTMENT (OUTPATIENT)
Dept: UROLOGY | Facility: CLINIC | Age: 84
End: 2024-04-03
Payer: MEDICARE

## 2024-04-03 PROCEDURE — 99213 OFFICE O/P EST LOW 20 MIN: CPT

## 2024-04-03 RX ORDER — MIRABEGRON 50 MG/1
50 TABLET, FILM COATED, EXTENDED RELEASE ORAL
Qty: 90 | Refills: 3 | Status: ACTIVE | COMMUNITY
Start: 2024-04-03 | End: 1900-01-01

## 2024-04-03 NOTE — HISTORY OF PRESENT ILLNESS
[FreeTextEntry1] : 2024 -- Pt is an 82 yo F with urinary frequency/urgency and urge incontinence, for which she is taking Myrbetriq 25 mg. Here today for f/u- patient doing well on Myrbetriq but still has some "accidents". She has no new urinary complaints.  PVR: 0cc (done to rule out incomplete bladder emptying)    23-- Pt is an 81 yo F with urinary frequency/urgency and urge incontinence, for which she is taking Myrbetriq 25mg. UDS evaluation from 12/15/22 showed Pt has impaired bladder contractility. She presents today for a follow-up visit to assess her progress on Myrbetriq. Pt reports she has been taking Myrbetriq for two weeks, and is voiding 5-6x/day. Since starting the medication, she has only experienced one episode of urinary urgency.   Pt has noticed that her blood pressure varies significantly, but is unsure if this is a side effect of Myrbretriq.  - BP from 23 = 141/71 - BP from  23 = 130/78 around 8am, 144/84 around 9am, 140/71 at 3:30pm  - BP from 22 = 146/65 - BP from 20 = 146/77   Pt plans on meeting with her PCP Dr. Desire Pineda in 2023.   BP does not seem to be related to when started Myrbetriq, however I will defer to Dr. Pineda.   Udip: (+) small leuks  PVR: 0 cc (to rule out incomplete bladder emptying)  12/15/22-- Pt is an 81 yo F with urinary frequency/urgency and urge incontinence who presents today for a urodynamics evaluation. She was prescribed Myrbetriq 25mg in the past, but never started the medication because it was too expensive through Medicare.    Pt reports that yesterday she experienced two episodes of urge incontinence while trying to get to the restroom. She states that when she experiences urinary urgency, she only has a few seconds to make it to the restroom.  Udip: (+) trace leuks    22-- Pt is a 80 yo F with urinary frequency/urgency and urge incontinence who presents today for a follow-up visit. At her last visit on 22, she was prescribed Myrbetriq 25mg. Today she reports that she never started Myrbetriq because it was too expensive - it would have been $300/month through Medicare.   Pt states that she is voiding q 20 minutes at home, but can go 6 hours without voiding when she is outside the house. 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 However, she recently experienced her first ever episode of urinary incontinence while at work.   Udip: (+) small leuks PVR: 0 cc (to rule out incomplete bladder emptying)   22-- 80 yo  with urinary frequency/urgency and urge incontinence for the last several years.  She wears pads for protection.  She has not tried any medications for this.  No significant UTI history.    She is not sexually active.   PMH: none  PSH: appy 2019, hernia/adelina , hysterectomy, (fibroids)  SH: non-smoker, min EtOH

## 2024-04-03 NOTE — ASSESSMENT
[FreeTextEntry1] : I discussed the findings and options with Ms. BOBBI RAI in detail.   We reviewed the implications of OAB medications, anticholinergic v beta agonists. I have described the risks and benefits to this approach.  We agreed that she will start increased dose of Myrbetriq 50mg to manage her OAB. The Rx was sent to her pharmacy.  Return in 4-6 weeks for re-assessment. Patient expressed understanding.

## 2024-04-03 NOTE — ADDENDUM
[FreeTextEntry1] : A portion of this note was written by [Raymond Buck] on 04/03/2024 acting as a scribe for Dr. Elizabeth.   I have personally reviewed the chart and agree that the record accurately reflects my personal performance of the history, physical exam, assessment, and plan.

## 2024-05-07 ENCOUNTER — APPOINTMENT (OUTPATIENT)
Dept: UROLOGY | Facility: CLINIC | Age: 84
End: 2024-05-07
Payer: MEDICARE

## 2024-05-07 VITALS — HEART RATE: 70 BPM | SYSTOLIC BLOOD PRESSURE: 128 MMHG | OXYGEN SATURATION: 99 % | DIASTOLIC BLOOD PRESSURE: 63 MMHG

## 2024-05-07 VITALS
DIASTOLIC BLOOD PRESSURE: 63 MMHG | BODY MASS INDEX: 30.49 KG/M2 | HEART RATE: 70 BPM | TEMPERATURE: 97.3 F | WEIGHT: 183 LBS | OXYGEN SATURATION: 99 % | HEIGHT: 65 IN | SYSTOLIC BLOOD PRESSURE: 128 MMHG

## 2024-05-07 PROCEDURE — 99214 OFFICE O/P EST MOD 30 MIN: CPT

## 2024-05-08 NOTE — ASSESSMENT
[FreeTextEntry1] : I discussed the findings and options with Ms. BOBBI RAI in detail.   Options for management of the patient's overactive bladder and incontinence discussed at length. These included medical therapy, behavioral modification, bladder retraining, and surgical options. Surgical options for third line therapies reviewed included injection of botulinum toxin versus sacral nerve stimulation therapy.   1. Consider botox vs SNM- will have to repeat UDS to determine if she is a good candidate.   2. For now, we agreed that she will complete a bladder diary to obtain functional data of her voiding pattern.   - complete a 24-hour bladder diary while on the myrbetriq.  - d/c the medication and repeat 24-hour bladder diary after 2 weeks to see if there's any changes to her symptoms.   Return in 2-3 weeks for re-assessment.  All of her questions were answered. Patient expressed understanding.    The total amount of time I have personally spent preparing for this visit, reviewing the patient's test results, obtaining external history, ordering tests/medications, documenting clinical information, communicating with and counseling the patient/family and/or caregiver(s), reviewing old records, and spent face to face with the patient explaining the above was 35 minutes.

## 2024-05-08 NOTE — HISTORY OF PRESENT ILLNESS
[FreeTextEntry1] : 2024 --  Pt is an 84 yo F with urinary frequency/urgency and urge incontinence. Here today for f/u- patient unsure if the increased dose of myrbetriq is helping her symptoms    2024 -- Pt is an 84 yo F with urinary frequency/urgency and urge incontinence, for which she is taking Myrbetriq 25 mg. Here today for f/u- patient doing well on Myrbetriq but still has some "accidents". She has no new urinary complaints.  PVR: 0cc (done to rule out incomplete bladder emptying)    23-- Pt is an 83 yo F with urinary frequency/urgency and urge incontinence, for which she is taking Myrbetriq 25mg. UDS evaluation from 12/15/22 showed Pt has impaired bladder contractility. She presents today for a follow-up visit to assess her progress on Myrbetriq. Pt reports she has been taking Myrbetriq for two weeks, and is voiding 5-6x/day. Since starting the medication, she has only experienced one episode of urinary urgency.   Pt has noticed that her blood pressure varies significantly, but is unsure if this is a side effect of Myrbretriq.  - BP from 23 = 141/71 - BP from  23 = 130/78 around 8am, 144/84 around 9am, 140/71 at 3:30pm  - BP from 22 = 146/65 - BP from 20 = 146/77   Pt plans on meeting with her PCP Dr. Desire Pineda in 2023.   BP does not seem to be related to when started Myrbetriq, however I will defer to Dr. Pineda.   Udip: (+) small leuks  PVR: 0 cc (to rule out incomplete bladder emptying)  12/15/22-- Pt is an 83 yo F with urinary frequency/urgency and urge incontinence who presents today for a urodynamics evaluation. She was prescribed Myrbetriq 25mg in the past, but never started the medication because it was too expensive through Medicare.    Pt reports that yesterday she experienced two episodes of urge incontinence while trying to get to the restroom. She states that when she experiences urinary urgency, she only has a few seconds to make it to the restroom.  Udip: (+) trace leuks    22-- Pt is a 80 yo F with urinary frequency/urgency and urge incontinence who presents today for a follow-up visit. At her last visit on 22, she was prescribed Myrbetriq 25mg. Today she reports that she never started Myrbetriq because it was too expensive - it would have been $300/month through Medicare.   Pt states that she is voiding q 20 minutes at home, but can go 6 hours without voiding when she is outside the house. 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 However, she recently experienced her first ever episode of urinary incontinence while at work.   Udip: (+) small leuks PVR: 0 cc (to rule out incomplete bladder emptying)   22-- 80 yo  with urinary frequency/urgency and urge incontinence for the last several years.  She wears pads for protection.  She has not tried any medications for this.  No significant UTI history.    She is not sexually active.   PMH: none  PSH: appy 2019, hernia/adelina , hysterectomy, (fibroids)  SH: non-smoker, min EtOH

## 2024-05-08 NOTE — ADDENDUM
[FreeTextEntry1] : A portion of this note was written by [Raymond Buck] on 05/07/2024 acting as a scribe for Dr. Elizabeth.   I have personally reviewed the chart and agree that the record accurately reflects my personal performance of the history, physical exam, assessment, and plan.

## 2024-05-28 ENCOUNTER — APPOINTMENT (OUTPATIENT)
Dept: UROLOGY | Facility: CLINIC | Age: 84
End: 2024-05-28

## 2024-06-03 ENCOUNTER — NON-APPOINTMENT (OUTPATIENT)
Age: 84
End: 2024-06-03

## 2024-06-03 ENCOUNTER — APPOINTMENT (OUTPATIENT)
Dept: OPHTHALMOLOGY | Facility: CLINIC | Age: 84
End: 2024-06-03

## 2024-10-16 ENCOUNTER — APPOINTMENT (OUTPATIENT)
Dept: OPHTHALMOLOGY | Facility: CLINIC | Age: 84
End: 2024-10-16

## 2024-10-16 ENCOUNTER — NON-APPOINTMENT (OUTPATIENT)
Age: 84
End: 2024-10-16

## 2025-02-24 NOTE — PATIENT PROFILE ADULT - NSPROGENDIFFINTUB_GEN_A_NUR
Hidradenitis suppurativa is a skin condition that causes lumps on the skin that look like pimples or boils. The lumps are usually painful and can break open and drain blood and bad-smelling pus. The condition can come and go for many years.  Treatment for this condition may include antibiotics and other medicines. You may need surgery to remove the lumps.     Home care includes wearing loose-fitting clothes and washing the area gently. You can help prevent lumps from coming back by staying at a healthy weight and not smoking.    Doctors don't know exactly how this condition starts. But they do know that something irritates and inflames the hair follicles, causing them to swell and form lumps. This skin condition can't be spread from person to person (isn't contagious).    Follow-up care is a key part of your treatment and safety. Be sure to make and go to all appointments, and call your doctor if you are having problems. It's also a good idea to know your test results and keep a list of the medicines you take. We have provided the contact information for two local dermatology offices. You can also make an appointment to see your primary care doctor to help manage this chronic condition. It is recommended that you be seen within 1 week.     How can you care for yourself at home?  Skin care  Wash the area every day with mild soap. Use your hands rather than a washcloth or sponge when you wash that part of your body.  Leave the affected areas uncovered when you can. If you have lumps that are draining, you can cover them with a bandage or other dressing. Put petroleum jelly (such as Vaseline) on the dressing to help keep it from sticking.  Wear-loose fitting clothes that don't rub against the area. Avoid activities that cause skin to rub together.  If you have pain, try a warm compress. Soak a towel or washcloth in warm water, wring it out, and place it on the affected skin for about 10 minutes. You should do this  every 2-3 hours for the next 72 hours. Continue to change out the bandage until the drainage stops.   Medicines     We have prescribed antibiotics - take these twice daily for 10 days. Take these with food & a full glass of water to avoid stomach/esophagus irritation. Do not lay down for 30-60 minutes after take your dose. Do not stop taking them just because you feel better. You need to take the full course of antibiotics. Additionally, these antibiotics can also cause sun sensitivity so please wear a hat/SPF/sun protection when outside.     Lifestyle choices  If you smoke, think about quitting. Smoking can make the condition worse. If you need help quitting, talk to your doctor about stop-smoking programs and medicines. These can increase your chances of quitting for good.  Stay at a healthy weight, or lose weight, by eating healthy foods and being physically active. Being overweight could make this condition worse.     never intubated

## 2025-03-17 ENCOUNTER — NON-APPOINTMENT (OUTPATIENT)
Age: 85
End: 2025-03-17

## 2025-03-17 ENCOUNTER — APPOINTMENT (OUTPATIENT)
Dept: OPHTHALMOLOGY | Facility: CLINIC | Age: 85
End: 2025-03-17

## 2025-03-20 DIAGNOSIS — M25.561 PAIN IN RIGHT KNEE: ICD-10-CM

## 2025-03-20 DIAGNOSIS — M25.562 PAIN IN LEFT KNEE: ICD-10-CM

## 2025-03-20 DIAGNOSIS — G89.29 PAIN IN RIGHT KNEE: ICD-10-CM

## 2025-03-20 DIAGNOSIS — G89.29 PAIN IN LEFT KNEE: ICD-10-CM

## 2025-03-24 ENCOUNTER — APPOINTMENT (OUTPATIENT)
Dept: ORTHOPEDIC SURGERY | Facility: CLINIC | Age: 85
End: 2025-03-24
Payer: MEDICARE

## 2025-03-24 ENCOUNTER — OUTPATIENT (OUTPATIENT)
Dept: OUTPATIENT SERVICES | Facility: HOSPITAL | Age: 85
LOS: 1 days | End: 2025-03-24
Payer: MEDICARE

## 2025-03-24 ENCOUNTER — APPOINTMENT (OUTPATIENT)
Dept: RADIOLOGY | Facility: CLINIC | Age: 85
End: 2025-03-24

## 2025-03-24 DIAGNOSIS — M17.12 UNILATERAL PRIMARY OSTEOARTHRITIS, LEFT KNEE: ICD-10-CM

## 2025-03-24 DIAGNOSIS — M17.0 BILATERAL PRIMARY OSTEOARTHRITIS OF KNEE: ICD-10-CM

## 2025-03-24 DIAGNOSIS — Z90.710 ACQUIRED ABSENCE OF BOTH CERVIX AND UTERUS: Chronic | ICD-10-CM

## 2025-03-24 DIAGNOSIS — M17.11 UNILATERAL PRIMARY OSTEOARTHRITIS, RIGHT KNEE: ICD-10-CM

## 2025-03-24 DIAGNOSIS — Z90.49 ACQUIRED ABSENCE OF OTHER SPECIFIED PARTS OF DIGESTIVE TRACT: Chronic | ICD-10-CM

## 2025-03-24 PROCEDURE — 77073 BONE LENGTH STUDIES: CPT

## 2025-03-24 PROCEDURE — 99204 OFFICE O/P NEW MOD 45 MIN: CPT

## 2025-03-24 PROCEDURE — 73564 X-RAY EXAM KNEE 4 OR MORE: CPT | Mod: 26,50,XE

## 2025-03-24 PROCEDURE — 73564 X-RAY EXAM KNEE 4 OR MORE: CPT

## 2025-03-24 PROCEDURE — 77073 BONE LENGTH STUDIES: CPT | Mod: 26

## 2025-04-17 ENCOUNTER — APPOINTMENT (OUTPATIENT)
Dept: OPHTHALMOLOGY | Facility: CLINIC | Age: 85
End: 2025-04-17

## 2025-04-23 ENCOUNTER — TRANSCRIPTION ENCOUNTER (OUTPATIENT)
Age: 85
End: 2025-04-23

## 2025-04-23 ENCOUNTER — EMERGENCY (EMERGENCY)
Age: 85
LOS: 1 days | End: 2025-04-23
Attending: STUDENT IN AN ORGANIZED HEALTH CARE EDUCATION/TRAINING PROGRAM | Admitting: EMERGENCY MEDICINE
Payer: MEDICARE

## 2025-04-23 VITALS
RESPIRATION RATE: 18 BRPM | HEART RATE: 60 BPM | DIASTOLIC BLOOD PRESSURE: 76 MMHG | SYSTOLIC BLOOD PRESSURE: 162 MMHG | TEMPERATURE: 98 F | OXYGEN SATURATION: 97 %

## 2025-04-23 DIAGNOSIS — S01.81XA LACERATION WITHOUT FOREIGN BODY OF OTHER PART OF HEAD, INITIAL ENCOUNTER: ICD-10-CM

## 2025-04-23 DIAGNOSIS — Z53.29 PROCEDURE AND TREATMENT NOT CARRIED OUT BECAUSE OF PATIENT'S DECISION FOR OTHER REASONS: ICD-10-CM
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
## 2025-04-23 DIAGNOSIS — Z90.49 ACQUIRED ABSENCE OF OTHER SPECIFIED PARTS OF DIGESTIVE TRACT: Chronic | ICD-10-CM

## 2025-04-23 DIAGNOSIS — Z90.710 ACQUIRED ABSENCE OF BOTH CERVIX AND UTERUS: Chronic | ICD-10-CM
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
## 2025-04-23 PROCEDURE — 99284 EMERGENCY DEPT VISIT MOD MDM: CPT | Mod: FS

## 2025-07-21 ENCOUNTER — APPOINTMENT (OUTPATIENT)
Dept: OPHTHALMOLOGY | Facility: CLINIC | Age: 85
End: 2025-07-21

## 2025-07-21 ENCOUNTER — NON-APPOINTMENT (OUTPATIENT)
Age: 85
End: 2025-07-21

## 2025-08-14 ENCOUNTER — EMERGENCY (EMERGENCY)
Age: 85
LOS: 1 days | End: 2025-08-14
Attending: EMERGENCY MEDICINE | Admitting: EMERGENCY MEDICINE
Payer: MEDICARE

## 2025-08-14 VITALS
TEMPERATURE: 98 F | SYSTOLIC BLOOD PRESSURE: 157 MMHG | WEIGHT: 186.95 LBS | HEART RATE: 65 BPM | RESPIRATION RATE: 16 BRPM | DIASTOLIC BLOOD PRESSURE: 84 MMHG | OXYGEN SATURATION: 95 %

## 2025-08-14 DIAGNOSIS — S80.01XA CONTUSION OF RIGHT KNEE, INITIAL ENCOUNTER: ICD-10-CM

## 2025-08-14 DIAGNOSIS — S60.221A CONTUSION OF RIGHT HAND, INITIAL ENCOUNTER: ICD-10-CM

## 2025-08-14 DIAGNOSIS — Z90.710 ACQUIRED ABSENCE OF BOTH CERVIX AND UTERUS: Chronic | ICD-10-CM
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
## 2025-08-14 DIAGNOSIS — S00.33XA CONTUSION OF NOSE, INITIAL ENCOUNTER: ICD-10-CM

## 2025-08-14 DIAGNOSIS — S09.90XA UNSPECIFIED INJURY OF HEAD, INITIAL ENCOUNTER: ICD-10-CM

## 2025-08-14 DIAGNOSIS — S60.222A CONTUSION OF LEFT HAND, INITIAL ENCOUNTER: ICD-10-CM
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
## 2025-08-14 DIAGNOSIS — Z90.49 ACQUIRED ABSENCE OF OTHER SPECIFIED PARTS OF DIGESTIVE TRACT: Chronic | ICD-10-CM

## 2025-08-14 PROCEDURE — 70450 CT HEAD/BRAIN W/O DYE: CPT | Mod: 26

## 2025-08-14 PROCEDURE — 73130 X-RAY EXAM OF HAND: CPT | Mod: 26,50

## 2025-08-14 PROCEDURE — 99284 EMERGENCY DEPT VISIT MOD MDM: CPT

## 2025-08-14 RX ORDER — ACETAMINOPHEN 500 MG/5ML
650 LIQUID (ML) ORAL ONCE
Refills: 0 | Status: COMPLETED | OUTPATIENT
Start: 2025-08-14 | End: 2025-08-14

## 2025-08-14 RX ORDER — IBUPROFEN 200 MG
400 TABLET ORAL ONCE
Refills: 0 | Status: COMPLETED | OUTPATIENT
Start: 2025-08-14 | End: 2025-08-14